# Patient Record
Sex: FEMALE | Race: WHITE | Employment: FULL TIME | URBAN - METROPOLITAN AREA
[De-identification: names, ages, dates, MRNs, and addresses within clinical notes are randomized per-mention and may not be internally consistent; named-entity substitution may affect disease eponyms.]

---

## 2017-03-09 ENCOUNTER — TELEPHONE (OUTPATIENT)
Dept: FAMILY MEDICINE CLINIC | Age: 57
End: 2017-03-09

## 2017-03-10 RX ORDER — AZITHROMYCIN 250 MG/1
TABLET, FILM COATED ORAL
Qty: 1 PACKET | Refills: 0 | Status: SHIPPED | OUTPATIENT
Start: 2017-03-10 | End: 2017-03-20

## 2017-03-10 RX ORDER — AZITHROMYCIN 250 MG/1
TABLET, FILM COATED ORAL
Qty: 1 PACKET | Refills: 0 | Status: SHIPPED | OUTPATIENT
Start: 2017-03-10 | End: 2017-03-10 | Stop reason: SDUPTHER

## 2017-04-24 RX ORDER — VALACYCLOVIR HYDROCHLORIDE 1 G/1
TABLET, FILM COATED ORAL
Qty: 21 TABLET | Refills: 4 | Status: SHIPPED | OUTPATIENT
Start: 2017-04-24 | End: 2018-06-23 | Stop reason: SDUPTHER

## 2017-06-07 RX ORDER — DIAZEPAM 5 MG/1
TABLET ORAL
Qty: 30 TABLET | Refills: 0 | Status: SHIPPED | OUTPATIENT
Start: 2017-06-07 | End: 2017-09-28 | Stop reason: SDUPTHER

## 2017-06-19 RX ORDER — ALPRAZOLAM 0.5 MG/1
TABLET ORAL
Qty: 30 TABLET | Refills: 0 | Status: SHIPPED | OUTPATIENT
Start: 2017-06-19 | End: 2020-08-12

## 2017-07-26 ENCOUNTER — OFFICE VISIT (OUTPATIENT)
Dept: FAMILY MEDICINE CLINIC | Age: 57
End: 2017-07-26

## 2017-07-26 VITALS
BODY MASS INDEX: 29.99 KG/M2 | DIASTOLIC BLOOD PRESSURE: 76 MMHG | WEIGHT: 180 LBS | HEART RATE: 73 BPM | SYSTOLIC BLOOD PRESSURE: 112 MMHG | HEIGHT: 65 IN | OXYGEN SATURATION: 97 %

## 2017-07-26 DIAGNOSIS — R53.83 OTHER FATIGUE: ICD-10-CM

## 2017-07-26 DIAGNOSIS — M25.50 ARTHRALGIA, UNSPECIFIED JOINT: ICD-10-CM

## 2017-07-26 DIAGNOSIS — Z86.79 HISTORY OF RHEUMATIC FEVER: ICD-10-CM

## 2017-07-26 DIAGNOSIS — Z00.00 ROUTINE PHYSICAL EXAMINATION: Primary | ICD-10-CM

## 2017-07-26 DIAGNOSIS — J30.1 SEASONAL ALLERGIC RHINITIS DUE TO POLLEN: ICD-10-CM

## 2017-07-26 DIAGNOSIS — Z87.891 HISTORY OF TOBACCO USE: ICD-10-CM

## 2017-07-26 DIAGNOSIS — E03.9 ACQUIRED HYPOTHYROIDISM: ICD-10-CM

## 2017-07-26 DIAGNOSIS — R00.2 HEART PALPITATIONS: ICD-10-CM

## 2017-07-26 DIAGNOSIS — Z11.4 SCREENING FOR HIV WITHOUT PRESENCE OF RISK FACTORS: ICD-10-CM

## 2017-07-26 PROCEDURE — 99396 PREV VISIT EST AGE 40-64: CPT | Performed by: FAMILY MEDICINE

## 2017-07-26 ASSESSMENT — PATIENT HEALTH QUESTIONNAIRE - PHQ9
SUM OF ALL RESPONSES TO PHQ QUESTIONS 1-9: 0
1. LITTLE INTEREST OR PLEASURE IN DOING THINGS: 0
SUM OF ALL RESPONSES TO PHQ9 QUESTIONS 1 & 2: 0
2. FEELING DOWN, DEPRESSED OR HOPELESS: 0

## 2017-07-26 ASSESSMENT — ENCOUNTER SYMPTOMS
SHORTNESS OF BREATH: 1
EYES NEGATIVE: 1

## 2017-07-27 LAB
A/G RATIO: 1.5 (ref 1.1–2.2)
ALBUMIN SERPL-MCNC: 4.3 G/DL (ref 3.4–5)
ALP BLD-CCNC: 101 U/L (ref 40–129)
ALT SERPL-CCNC: 30 U/L (ref 10–40)
ANION GAP SERPL CALCULATED.3IONS-SCNC: 15 MMOL/L (ref 3–16)
AST SERPL-CCNC: 27 U/L (ref 15–37)
BASOPHILS ABSOLUTE: 0.1 K/UL (ref 0–0.2)
BASOPHILS RELATIVE PERCENT: 1.3 %
BILIRUB SERPL-MCNC: 0.3 MG/DL (ref 0–1)
BUN BLDV-MCNC: 10 MG/DL (ref 7–20)
C-REACTIVE PROTEIN, HIGH SENSITIVITY: 0.46 MG/L (ref 0.16–3)
CALCIUM SERPL-MCNC: 9.5 MG/DL (ref 8.3–10.6)
CHLORIDE BLD-SCNC: 101 MMOL/L (ref 99–110)
CO2: 22 MMOL/L (ref 21–32)
CREAT SERPL-MCNC: 0.6 MG/DL (ref 0.6–1.1)
EOSINOPHILS ABSOLUTE: 0.2 K/UL (ref 0–0.6)
EOSINOPHILS RELATIVE PERCENT: 4 %
FOLATE: 18.82 NG/ML (ref 4.78–24.2)
GFR AFRICAN AMERICAN: >60
GFR NON-AFRICAN AMERICAN: >60
GLOBULIN: 2.9 G/DL
GLUCOSE BLD-MCNC: 106 MG/DL (ref 70–99)
HCT VFR BLD CALC: 44.7 % (ref 36–48)
HEMOGLOBIN: 14.9 G/DL (ref 12–16)
LYMPHOCYTES ABSOLUTE: 1.9 K/UL (ref 1–5.1)
LYMPHOCYTES RELATIVE PERCENT: 38.7 %
MCH RBC QN AUTO: 32.7 PG (ref 26–34)
MCHC RBC AUTO-ENTMCNC: 33.4 G/DL (ref 31–36)
MCV RBC AUTO: 97.9 FL (ref 80–100)
MONOCYTES ABSOLUTE: 0.4 K/UL (ref 0–1.3)
MONOCYTES RELATIVE PERCENT: 8.9 %
NEUTROPHILS ABSOLUTE: 2.4 K/UL (ref 1.7–7.7)
NEUTROPHILS RELATIVE PERCENT: 47.1 %
PDW BLD-RTO: 13.5 % (ref 12.4–15.4)
PLATELET # BLD: 238 K/UL (ref 135–450)
PMV BLD AUTO: 9.5 FL (ref 5–10.5)
POTASSIUM SERPL-SCNC: 4.8 MMOL/L (ref 3.5–5.1)
RBC # BLD: 4.57 M/UL (ref 4–5.2)
RHEUMATOID FACTOR: <10 IU/ML
SEDIMENTATION RATE, ERYTHROCYTE: 7 MM/HR (ref 0–30)
SODIUM BLD-SCNC: 138 MMOL/L (ref 136–145)
TOTAL PROTEIN: 7.2 G/DL (ref 6.4–8.2)
TSH REFLEX: 3.01 UIU/ML (ref 0.27–4.2)
VITAMIN B-12: 478 PG/ML (ref 211–911)
WBC # BLD: 5 K/UL (ref 4–11)

## 2017-07-28 LAB
ANA INTERPRETATION: ABNORMAL
ANA TITER: ABNORMAL
ANTI-NUCLEAR ANTIBODY (ANA): POSITIVE
ESTIMATED AVERAGE GLUCOSE: 122.6 MG/DL
HBA1C MFR BLD: 5.9 %
HIV-1 AND HIV-2 ANTIBODIES: NORMAL

## 2017-08-01 LAB — DHEAS (DHEA SULFATE): <15 UG/DL (ref 26–200)

## 2017-08-14 RX ORDER — LEVOTHYROXINE SODIUM 0.07 MG/1
TABLET ORAL
Qty: 90 TABLET | Refills: 1 | Status: SHIPPED | OUTPATIENT
Start: 2017-08-14 | End: 2017-12-14 | Stop reason: SDUPTHER

## 2017-08-21 ENCOUNTER — HOSPITAL ENCOUNTER (OUTPATIENT)
Dept: NON INVASIVE DIAGNOSTICS | Age: 57
Discharge: HOME | End: 2017-08-21
Attending: FAMILY MEDICINE | Admitting: FAMILY MEDICINE

## 2017-08-21 DIAGNOSIS — Z86.79 PERSONAL HISTORY OF OTHER DISEASES OF THE CIRCULATORY SYSTEM: ICD-10-CM

## 2017-08-21 DIAGNOSIS — Z86.79 HISTORY OF RHEUMATIC FEVER: ICD-10-CM

## 2017-08-21 DIAGNOSIS — R00.2 HEART PALPITATIONS: ICD-10-CM

## 2017-08-21 LAB
LV EF: 58 %
LVEF MODALITY: NORMAL

## 2017-09-29 RX ORDER — DIAZEPAM 5 MG/1
TABLET ORAL
Qty: 30 TABLET | Refills: 0 | Status: SHIPPED | OUTPATIENT
Start: 2017-09-29 | End: 2018-01-06 | Stop reason: SDUPTHER

## 2017-12-11 ENCOUNTER — OFFICE VISIT (OUTPATIENT)
Dept: FAMILY MEDICINE CLINIC | Age: 57
End: 2017-12-11

## 2017-12-11 VITALS
BODY MASS INDEX: 30.59 KG/M2 | WEIGHT: 183.8 LBS | SYSTOLIC BLOOD PRESSURE: 120 MMHG | DIASTOLIC BLOOD PRESSURE: 76 MMHG | TEMPERATURE: 98.3 F

## 2017-12-11 DIAGNOSIS — B96.89 ACUTE BACTERIAL SINUSITIS: Primary | ICD-10-CM

## 2017-12-11 DIAGNOSIS — H66.92 LEFT OTITIS MEDIA, UNSPECIFIED OTITIS MEDIA TYPE: ICD-10-CM

## 2017-12-11 DIAGNOSIS — J01.90 ACUTE BACTERIAL SINUSITIS: Primary | ICD-10-CM

## 2017-12-11 PROCEDURE — 3017F COLORECTAL CA SCREEN DOC REV: CPT | Performed by: FAMILY MEDICINE

## 2017-12-11 PROCEDURE — G8427 DOCREV CUR MEDS BY ELIG CLIN: HCPCS | Performed by: FAMILY MEDICINE

## 2017-12-11 PROCEDURE — 3014F SCREEN MAMMO DOC REV: CPT | Performed by: FAMILY MEDICINE

## 2017-12-11 PROCEDURE — 99213 OFFICE O/P EST LOW 20 MIN: CPT | Performed by: FAMILY MEDICINE

## 2017-12-11 PROCEDURE — G8417 CALC BMI ABV UP PARAM F/U: HCPCS | Performed by: FAMILY MEDICINE

## 2017-12-11 PROCEDURE — 1036F TOBACCO NON-USER: CPT | Performed by: FAMILY MEDICINE

## 2017-12-11 PROCEDURE — G8482 FLU IMMUNIZE ORDER/ADMIN: HCPCS | Performed by: FAMILY MEDICINE

## 2017-12-11 RX ORDER — AZITHROMYCIN 250 MG/1
TABLET, FILM COATED ORAL
Qty: 1 PACKET | Refills: 0 | Status: SHIPPED | OUTPATIENT
Start: 2017-12-11 | End: 2018-01-02 | Stop reason: SDUPTHER

## 2017-12-11 ASSESSMENT — ENCOUNTER SYMPTOMS
GASTROINTESTINAL NEGATIVE: 1
SINUS PRESSURE: 1
EYES NEGATIVE: 1
RHINORRHEA: 1
SINUS PAIN: 1
WHEEZING: 0
COUGH: 1

## 2017-12-11 NOTE — PROGRESS NOTES
Subjective:      Patient ID: Gayle Verde is a 62 y.o. female. Chief Complaint   Patient presents with    Otalgia     left x 1 wk    Congestion    Cough     some production green       HPI  Cold x over a week  Now chest & ear   Mucous is yellow  OTC Saline   Claritin     Review of Systems   HENT: Positive for congestion, ear pain, rhinorrhea, sinus pain and sinus pressure. RT side of face+ pain   Eyes: Negative. Respiratory: Positive for cough. Negative for wheezing. Cardiovascular: Negative. Gastrointestinal: Negative. Endocrine:        Thyroid    Genitourinary: Negative for dysuria. Musculoskeletal: Negative. Allergic/Immunologic: Positive for environmental allergies. Neurological: Negative for headaches. Hematological: Negative. Objective:   Physical Exam   Constitutional: She is oriented to person, place, and time. She appears well-developed and well-nourished. No distress. HENT:   Head: Normocephalic. Left TM red   Rt TM clear  Tender RT side of face   Eyes: Conjunctivae and EOM are normal. Pupils are equal, round, and reactive to light. Neck: Normal range of motion. Neck supple. Cardiovascular: Normal rate, regular rhythm and normal heart sounds. No murmur heard. Pulmonary/Chest: Effort normal. She has no wheezes. She has no rales. Neurological: She is alert and oriented to person, place, and time. Skin: Skin is warm. She is not diaphoretic. Vitals reviewed. /76   Temp 98.3 °F (36.8 °C) (Oral)   Wt 183 lb 12.8 oz (83.4 kg)   LMP 01/21/2007   BMI 30.59 kg/m²     Assessment:      1. Acute bacterial sinusitis  Loratadine (CLARITIN PO)    azithromycin (ZITHROMAX) 250 MG tablet   2.  Left otitis media, unspecified otitis media type  Loratadine (CLARITIN PO)    azithromycin (ZITHROMAX) 250 MG tablet           Plan:      She has Flonase  Coricidin HB   She is going [place] [facility] in March 2018

## 2017-12-15 RX ORDER — LEVOTHYROXINE SODIUM 0.07 MG/1
TABLET ORAL
Qty: 90 TABLET | Refills: 1 | Status: SHIPPED | OUTPATIENT
Start: 2017-12-15 | End: 2018-01-26 | Stop reason: SDUPTHER

## 2018-01-02 DIAGNOSIS — B96.89 ACUTE BACTERIAL SINUSITIS: ICD-10-CM

## 2018-01-02 DIAGNOSIS — J01.90 ACUTE BACTERIAL SINUSITIS: ICD-10-CM

## 2018-01-02 DIAGNOSIS — H66.92 LEFT OTITIS MEDIA, UNSPECIFIED OTITIS MEDIA TYPE: ICD-10-CM

## 2018-01-02 RX ORDER — AZITHROMYCIN 250 MG/1
TABLET, FILM COATED ORAL
Qty: 1 PACKET | Refills: 0 | Status: SHIPPED | OUTPATIENT
Start: 2018-01-02 | End: 2018-01-12

## 2018-01-05 DIAGNOSIS — R73.09 HIGH GLUCOSE: Primary | ICD-10-CM

## 2018-01-06 DIAGNOSIS — F41.9 ANXIETY: Primary | ICD-10-CM

## 2018-01-08 RX ORDER — DIAZEPAM 5 MG/1
TABLET ORAL
Qty: 30 TABLET | Refills: 0 | Status: SHIPPED | OUTPATIENT
Start: 2018-01-08 | End: 2018-01-26 | Stop reason: SDUPTHER

## 2018-01-26 DIAGNOSIS — F41.9 ANXIETY: ICD-10-CM

## 2018-01-26 DIAGNOSIS — R73.09 HIGH GLUCOSE: ICD-10-CM

## 2018-01-26 LAB — GLUCOSE BLD-MCNC: 101 MG/DL (ref 70–99)

## 2018-01-26 RX ORDER — LEVOTHYROXINE SODIUM 0.07 MG/1
TABLET ORAL
Qty: 90 TABLET | Refills: 1 | Status: SHIPPED | OUTPATIENT
Start: 2018-01-26 | End: 2018-04-20 | Stop reason: SDUPTHER

## 2018-01-26 RX ORDER — DIAZEPAM 5 MG/1
TABLET ORAL
Qty: 30 TABLET | Refills: 0 | Status: SHIPPED | OUTPATIENT
Start: 2018-01-26 | End: 2018-06-11 | Stop reason: SDUPTHER

## 2018-01-26 RX ORDER — FAMOTIDINE 10 MG
20 TABLET ORAL 2 TIMES DAILY
Qty: 60 TABLET | Refills: 5 | Status: SHIPPED | OUTPATIENT
Start: 2018-01-26 | End: 2019-05-06 | Stop reason: SDUPTHER

## 2018-02-26 ENCOUNTER — PATIENT MESSAGE (OUTPATIENT)
Dept: FAMILY MEDICINE CLINIC | Age: 58
End: 2018-02-26

## 2018-04-20 RX ORDER — LEVOTHYROXINE SODIUM 0.07 MG/1
TABLET ORAL
Qty: 90 TABLET | Refills: 1 | Status: SHIPPED | OUTPATIENT
Start: 2018-04-20 | End: 2018-12-04 | Stop reason: SDUPTHER

## 2018-06-11 DIAGNOSIS — F41.9 ANXIETY: ICD-10-CM

## 2018-06-11 DIAGNOSIS — T75.3XXA MOTION SICKNESS, INITIAL ENCOUNTER: Primary | ICD-10-CM

## 2018-06-11 RX ORDER — DIAZEPAM 5 MG/1
TABLET ORAL
Qty: 10 TABLET | Refills: 0 | Status: SHIPPED | OUTPATIENT
Start: 2018-06-11 | End: 2018-09-05 | Stop reason: SDUPTHER

## 2018-06-25 RX ORDER — VALACYCLOVIR HYDROCHLORIDE 1 G/1
TABLET, FILM COATED ORAL
Qty: 21 TABLET | Refills: 3 | Status: SHIPPED | OUTPATIENT
Start: 2018-06-25 | End: 2019-01-14 | Stop reason: SDUPTHER

## 2018-07-21 RX ORDER — MINOCYCLINE HYDROCHLORIDE 75 MG/1
CAPSULE ORAL
Qty: 60 CAPSULE | Refills: 1 | Status: SHIPPED | OUTPATIENT
Start: 2018-07-21 | End: 2019-01-23 | Stop reason: SDUPTHER

## 2018-07-21 NOTE — TELEPHONE ENCOUNTER
Medication:   Requested Prescriptions     Pending Prescriptions Disp Refills    minocycline (MINOCIN;DYNACIN) 75 MG capsule [Pharmacy Med Name: MINOCYCLINE 75 MG CAPSULE] 60 capsule 1     Sig: TAKE ONE CAPSULE BY MOUTH TWICE A DAY   last appt:  12.11.17  Next appt: Visit date not found

## 2018-08-13 ENCOUNTER — OFFICE VISIT (OUTPATIENT)
Dept: FAMILY MEDICINE CLINIC | Age: 58
End: 2018-08-13

## 2018-08-13 VITALS
HEIGHT: 66 IN | HEART RATE: 69 BPM | OXYGEN SATURATION: 98 % | TEMPERATURE: 98.3 F | DIASTOLIC BLOOD PRESSURE: 72 MMHG | RESPIRATION RATE: 16 BRPM | WEIGHT: 185.8 LBS | BODY MASS INDEX: 29.86 KG/M2 | SYSTOLIC BLOOD PRESSURE: 118 MMHG

## 2018-08-13 DIAGNOSIS — Z00.00 ROUTINE PHYSICAL EXAMINATION: ICD-10-CM

## 2018-08-13 DIAGNOSIS — R00.2 HEART PALPITATIONS: ICD-10-CM

## 2018-08-13 DIAGNOSIS — Z13.220 LIPID SCREENING: ICD-10-CM

## 2018-08-13 DIAGNOSIS — K90.0 CELIAC DISEASE: ICD-10-CM

## 2018-08-13 DIAGNOSIS — Z87.891 FORMER SMOKER: ICD-10-CM

## 2018-08-13 DIAGNOSIS — Z00.00 ROUTINE PHYSICAL EXAMINATION: Primary | ICD-10-CM

## 2018-08-13 DIAGNOSIS — R14.0 ABDOMINAL BLOATING: ICD-10-CM

## 2018-08-13 DIAGNOSIS — Z13.1 DIABETES MELLITUS SCREENING: ICD-10-CM

## 2018-08-13 DIAGNOSIS — E03.9 ACQUIRED HYPOTHYROIDISM: ICD-10-CM

## 2018-08-13 DIAGNOSIS — R39.15 URGENCY OF URINATION: ICD-10-CM

## 2018-08-13 LAB
BASOPHILS ABSOLUTE: 0 K/UL (ref 0–0.2)
BASOPHILS RELATIVE PERCENT: 0.8 %
BILIRUBIN, POC: NORMAL
BLOOD URINE, POC: NORMAL
CLARITY, POC: CLEAR
COLOR, POC: YELLOW
EOSINOPHILS ABSOLUTE: 0.1 K/UL (ref 0–0.6)
EOSINOPHILS RELATIVE PERCENT: 1.7 %
GLUCOSE URINE, POC: NORMAL
HCT VFR BLD CALC: 44.5 % (ref 36–48)
HEMOGLOBIN: 14.6 G/DL (ref 12–16)
KETONES, POC: NORMAL
LEUKOCYTE EST, POC: NORMAL
LYMPHOCYTES ABSOLUTE: 1.8 K/UL (ref 1–5.1)
LYMPHOCYTES RELATIVE PERCENT: 34.7 %
MCH RBC QN AUTO: 32.1 PG (ref 26–34)
MCHC RBC AUTO-ENTMCNC: 32.9 G/DL (ref 31–36)
MCV RBC AUTO: 97.4 FL (ref 80–100)
MONOCYTES ABSOLUTE: 0.6 K/UL (ref 0–1.3)
MONOCYTES RELATIVE PERCENT: 10.9 %
NEUTROPHILS ABSOLUTE: 2.8 K/UL (ref 1.7–7.7)
NEUTROPHILS RELATIVE PERCENT: 51.9 %
NITRITE, POC: NORMAL
PDW BLD-RTO: 14.5 % (ref 12.4–15.4)
PH, POC: 7
PLATELET # BLD: 240 K/UL (ref 135–450)
PMV BLD AUTO: 9.4 FL (ref 5–10.5)
PROTEIN, POC: NORMAL
RBC # BLD: 4.57 M/UL (ref 4–5.2)
SPECIFIC GRAVITY, POC: 1.01
UROBILINOGEN, POC: 0.2
WBC # BLD: 5.3 K/UL (ref 4–11)

## 2018-08-13 PROCEDURE — 81002 URINALYSIS NONAUTO W/O SCOPE: CPT | Performed by: FAMILY MEDICINE

## 2018-08-13 PROCEDURE — 99396 PREV VISIT EST AGE 40-64: CPT | Performed by: FAMILY MEDICINE

## 2018-08-13 ASSESSMENT — ENCOUNTER SYMPTOMS
EYE ITCHING: 0
VOICE CHANGE: 0
EYE REDNESS: 0
ABDOMINAL DISTENTION: 0
DIARRHEA: 0
BACK PAIN: 0
CONSTIPATION: 0
WHEEZING: 0
APNEA: 0
NAUSEA: 0
COUGH: 0
EYE DISCHARGE: 0
PHOTOPHOBIA: 0
SINUS PAIN: 0
VOMITING: 0
SINUS PRESSURE: 0
EYE PAIN: 0
SORE THROAT: 0
CHEST TIGHTNESS: 0
CHOKING: 0
BLOOD IN STOOL: 0
ABDOMINAL PAIN: 0
COLOR CHANGE: 0
SHORTNESS OF BREATH: 0
RHINORRHEA: 0
TROUBLE SWALLOWING: 0

## 2018-08-13 ASSESSMENT — PATIENT HEALTH QUESTIONNAIRE - PHQ9
2. FEELING DOWN, DEPRESSED OR HOPELESS: 0
SUM OF ALL RESPONSES TO PHQ QUESTIONS 1-9: 0
1. LITTLE INTEREST OR PLEASURE IN DOING THINGS: 0
SUM OF ALL RESPONSES TO PHQ QUESTIONS 1-9: 0
SUM OF ALL RESPONSES TO PHQ9 QUESTIONS 1 & 2: 0

## 2018-08-13 NOTE — PROGRESS NOTES
Samia Collet  Due for GYM  Due for Mammogram   Musculoskeletal: Negative for back pain and gait problem. Skin: Negative for color change and rash. Allergic/Immunologic: Positive for environmental allergies. Negative for food allergies. Advice get allergy shot  But she didn't like idea   Neurological: Negative for dizziness, tremors, light-headedness, numbness and headaches. Psychiatric/Behavioral: Negative for agitation, behavioral problems, decreased concentration and sleep disturbance. The patient is not hyperactive. OBJECTIVE:  /72 (Site: Left Arm, Position: Sitting, Cuff Size: Medium Adult)   Pulse 69   Temp 98.3 °F (36.8 °C) (Oral)   Resp 16   Ht 5' 5.75\" (1.67 m)   Wt 185 lb 12.8 oz (84.3 kg)   LMP 01/21/2007   SpO2 98%   BMI 30.22 kg/m²   Physical Exam   Constitutional: She is oriented to person, place, and time. She appears well-developed and well-nourished. No distress. HENT:   Head: Normocephalic. Right Ear: External ear normal.   Left Ear: External ear normal.   Nose: Nose normal.   Mouth/Throat: Oropharynx is clear and moist.   Eyes: Pupils are equal, round, and reactive to light. Conjunctivae and EOM are normal. Right eye exhibits no discharge. Left eye exhibits no discharge. No scleral icterus. Neck: Normal range of motion. Neck supple. No thyromegaly present. Cardiovascular: Normal rate, regular rhythm, normal heart sounds and intact distal pulses. No murmur heard. Pulmonary/Chest: Effort normal and breath sounds normal. No respiratory distress. She has no wheezes. She has no rales. She exhibits no tenderness. Abdominal: Soft. Bowel sounds are normal. She exhibits no distension and no mass. There is no tenderness. There is no rebound and no guarding. Musculoskeletal: Normal range of motion. She exhibits no edema. Lymphadenopathy:     She has no cervical adenopathy. Neurological: She is alert and oriented to person, place, and time.  She has normal

## 2018-08-14 LAB
A/G RATIO: 1.6 (ref 1.1–2.2)
ALBUMIN SERPL-MCNC: 4.5 G/DL (ref 3.4–5)
ALP BLD-CCNC: 78 U/L (ref 40–129)
ALT SERPL-CCNC: 31 U/L (ref 10–40)
ANION GAP SERPL CALCULATED.3IONS-SCNC: 12 MMOL/L (ref 3–16)
AST SERPL-CCNC: 30 U/L (ref 15–37)
BILIRUB SERPL-MCNC: 0.4 MG/DL (ref 0–1)
BUN BLDV-MCNC: 8 MG/DL (ref 7–20)
CALCIUM SERPL-MCNC: 10 MG/DL (ref 8.3–10.6)
CHLORIDE BLD-SCNC: 102 MMOL/L (ref 99–110)
CHOLESTEROL, TOTAL: 181 MG/DL (ref 0–199)
CO2: 26 MMOL/L (ref 21–32)
CREAT SERPL-MCNC: 0.6 MG/DL (ref 0.6–1.1)
ESTIMATED AVERAGE GLUCOSE: 114 MG/DL
GFR AFRICAN AMERICAN: >60
GFR NON-AFRICAN AMERICAN: >60
GLOBULIN: 2.9 G/DL
GLUCOSE BLD-MCNC: 94 MG/DL (ref 70–99)
HBA1C MFR BLD: 5.6 %
HDLC SERPL-MCNC: 73 MG/DL (ref 40–60)
LDL CHOLESTEROL CALCULATED: 90 MG/DL
POTASSIUM SERPL-SCNC: 4.9 MMOL/L (ref 3.5–5.1)
SODIUM BLD-SCNC: 140 MMOL/L (ref 136–145)
TOTAL PROTEIN: 7.4 G/DL (ref 6.4–8.2)
TRIGL SERPL-MCNC: 89 MG/DL (ref 0–150)
TSH SERPL DL<=0.05 MIU/L-ACNC: 1.68 UIU/ML (ref 0.27–4.2)
VLDLC SERPL CALC-MCNC: 18 MG/DL

## 2018-08-15 LAB — URINE CULTURE, ROUTINE: NORMAL

## 2018-09-05 DIAGNOSIS — T75.3XXA MOTION SICKNESS, INITIAL ENCOUNTER: ICD-10-CM

## 2018-09-05 DIAGNOSIS — F41.9 ANXIETY: ICD-10-CM

## 2018-09-05 RX ORDER — DIAZEPAM 5 MG/1
TABLET ORAL
Qty: 10 TABLET | Refills: 0 | Status: SHIPPED | OUTPATIENT
Start: 2018-09-05 | End: 2018-09-15

## 2018-09-26 ENCOUNTER — TELEPHONE (OUTPATIENT)
Dept: FAMILY MEDICINE CLINIC | Age: 58
End: 2018-09-26

## 2018-09-26 ENCOUNTER — OFFICE VISIT (OUTPATIENT)
Dept: FAMILY MEDICINE CLINIC | Age: 58
End: 2018-09-26
Payer: COMMERCIAL

## 2018-09-26 VITALS
SYSTOLIC BLOOD PRESSURE: 122 MMHG | DIASTOLIC BLOOD PRESSURE: 82 MMHG | HEART RATE: 79 BPM | OXYGEN SATURATION: 96 % | WEIGHT: 189 LBS | TEMPERATURE: 98.5 F | BODY MASS INDEX: 30.74 KG/M2

## 2018-09-26 DIAGNOSIS — J01.90 ACUTE BACTERIAL SINUSITIS: Primary | ICD-10-CM

## 2018-09-26 DIAGNOSIS — B96.89 ACUTE BACTERIAL SINUSITIS: Primary | ICD-10-CM

## 2018-09-26 DIAGNOSIS — J30.1 SEASONAL ALLERGIC RHINITIS DUE TO POLLEN: ICD-10-CM

## 2018-09-26 DIAGNOSIS — M54.2 NECK PAIN: ICD-10-CM

## 2018-09-26 PROCEDURE — 3017F COLORECTAL CA SCREEN DOC REV: CPT | Performed by: FAMILY MEDICINE

## 2018-09-26 PROCEDURE — 99213 OFFICE O/P EST LOW 20 MIN: CPT | Performed by: FAMILY MEDICINE

## 2018-09-26 PROCEDURE — G8427 DOCREV CUR MEDS BY ELIG CLIN: HCPCS | Performed by: FAMILY MEDICINE

## 2018-09-26 PROCEDURE — 1036F TOBACCO NON-USER: CPT | Performed by: FAMILY MEDICINE

## 2018-09-26 PROCEDURE — G8417 CALC BMI ABV UP PARAM F/U: HCPCS | Performed by: FAMILY MEDICINE

## 2018-09-26 RX ORDER — AZITHROMYCIN 250 MG/1
250 TABLET, FILM COATED ORAL DAILY
Qty: 1 PACKET | Refills: 0 | Status: SHIPPED | OUTPATIENT
Start: 2018-09-26 | End: 2018-09-26

## 2018-09-26 RX ORDER — AZITHROMYCIN 250 MG/1
TABLET, FILM COATED ORAL
Qty: 1 PACKET | Refills: 0 | Status: SHIPPED | OUTPATIENT
Start: 2018-09-26 | End: 2019-08-06 | Stop reason: ALTCHOICE

## 2018-09-26 ASSESSMENT — ENCOUNTER SYMPTOMS
SHORTNESS OF BREATH: 0
WHEEZING: 0
CHOKING: 0
SINUS PRESSURE: 1
COUGH: 0
EYES NEGATIVE: 1
SORE THROAT: 1

## 2018-11-05 ENCOUNTER — TELEPHONE (OUTPATIENT)
Dept: FAMILY MEDICINE CLINIC | Age: 58
End: 2018-11-05

## 2018-11-05 NOTE — TELEPHONE ENCOUNTER
Pt advised states she wants to go to [facility] since that is where she had her others done.

## 2018-11-09 ENCOUNTER — TELEPHONE (OUTPATIENT)
Dept: FAMILY MEDICINE CLINIC | Age: 58
End: 2018-11-09

## 2018-11-12 ENCOUNTER — TELEPHONE (OUTPATIENT)
Dept: FAMILY MEDICINE CLINIC | Age: 58
End: 2018-11-12

## 2018-12-05 RX ORDER — LEVOTHYROXINE SODIUM 0.07 MG/1
TABLET ORAL
Qty: 90 TABLET | Refills: 1 | Status: SHIPPED | OUTPATIENT
Start: 2018-12-05 | End: 2019-02-25

## 2018-12-18 ENCOUNTER — PATIENT MESSAGE (OUTPATIENT)
Dept: FAMILY MEDICINE CLINIC | Age: 58
End: 2018-12-18

## 2018-12-18 NOTE — TELEPHONE ENCOUNTER
From: Franck Gonzalez  To: Michelle Hurd MD  Sent: 12/18/2018 12:06 PM EST  Subject: Visit Follow-Up Question    I received a denial for my CT chest scan. The letter says it was coded wrong. They said they informed you so please let me know when this is corrected and rescheduled (submitted).

## 2019-01-14 RX ORDER — VALACYCLOVIR HYDROCHLORIDE 1 G/1
1000 TABLET, FILM COATED ORAL DAILY
Qty: 21 TABLET | Refills: 3 | Status: SHIPPED | OUTPATIENT
Start: 2019-01-14 | End: 2019-07-29

## 2019-01-23 RX ORDER — MINOCYCLINE HYDROCHLORIDE 75 MG/1
CAPSULE ORAL
Qty: 60 CAPSULE | Refills: 2 | Status: SHIPPED | OUTPATIENT
Start: 2019-01-23 | End: 2019-08-06 | Stop reason: ALTCHOICE

## 2019-04-12 RX ORDER — PRASTERONE (DHEA) 25 MG
25 CAPSULE ORAL DAILY
Qty: 30 CAPSULE | Refills: 5 | Status: SHIPPED | OUTPATIENT
Start: 2019-04-12 | End: 2019-08-12

## 2019-04-12 NOTE — TELEPHONE ENCOUNTER
Medication:   Requested Prescriptions     Pending Prescriptions Disp Refills    DHEA 25 MG CAPS 30 capsule 5     Last Filled: 2.4.19  Last appt: 9.26.18   Next appt: Visit date not found    Last OARRS:   RX Monitoring 1/8/2018   Attestation The Prescription Monitoring Report for this patient was reviewed today.

## 2019-05-07 RX ORDER — FAMOTIDINE 10 MG
20 TABLET ORAL 2 TIMES DAILY
Qty: 60 TABLET | Refills: 5 | Status: SHIPPED | OUTPATIENT
Start: 2019-05-07

## 2019-05-24 ENCOUNTER — TELEPHONE (OUTPATIENT)
Dept: FAMILY MEDICINE CLINIC | Age: 59
End: 2019-05-24

## 2019-05-24 NOTE — TELEPHONE ENCOUNTER
Eyes are red, swollen, clear discharged, and they hurt, not matted in the AM.  Feels like it is allergy induced. She is allergic to dust and she had took down her curtains two days ago and they were irene. She doesn't want to be seen if not needed. Please advise. Medication:   Requested Prescriptions      No prescriptions requested or ordered in this encounter          Last appt: 9/26/2018   Next appt: Visit date not found    Last OARRS:   RX Monitoring 1/8/2018   Attestation The Prescription Monitoring Report for this patient was reviewed today.

## 2019-05-24 NOTE — TELEPHONE ENCOUNTER
Clear eye watery & swollen eye lid  She is using cold compressor with good result  She has allergy eye drop from eye specialist she will use eye drops  OTC Claritin

## 2019-07-01 RX ORDER — LEVOTHYROXINE SODIUM 0.07 MG/1
TABLET ORAL
Qty: 30 TABLET | Refills: 0 | Status: SHIPPED | OUTPATIENT
Start: 2019-07-01 | End: 2019-08-06 | Stop reason: SDUPTHER

## 2019-07-29 RX ORDER — VALACYCLOVIR HYDROCHLORIDE 1 G/1
TABLET, FILM COATED ORAL
Qty: 21 TABLET | Refills: 2 | Status: SHIPPED | OUTPATIENT
Start: 2019-07-29 | End: 2020-08-12

## 2019-08-05 ENCOUNTER — OFFICE VISIT (OUTPATIENT)
Dept: PRIMARY CARE CLINIC | Age: 59
End: 2019-08-05
Payer: COMMERCIAL

## 2019-08-05 VITALS
HEIGHT: 66 IN | OXYGEN SATURATION: 98 % | WEIGHT: 187.8 LBS | DIASTOLIC BLOOD PRESSURE: 72 MMHG | SYSTOLIC BLOOD PRESSURE: 116 MMHG | HEART RATE: 73 BPM | BODY MASS INDEX: 30.18 KG/M2

## 2019-08-05 DIAGNOSIS — E07.9 THYROID DISORDER: ICD-10-CM

## 2019-08-05 DIAGNOSIS — R00.2 HEART PALPITATIONS: ICD-10-CM

## 2019-08-05 DIAGNOSIS — R60.9 PERIPHERAL EDEMA: ICD-10-CM

## 2019-08-05 DIAGNOSIS — H53.9 VISUAL CHANGES: ICD-10-CM

## 2019-08-05 DIAGNOSIS — H02.533 LID RETRACTION OF RIGHT EYE: ICD-10-CM

## 2019-08-05 DIAGNOSIS — Z00.00 ROUTINE PHYSICAL EXAMINATION: Primary | ICD-10-CM

## 2019-08-05 PROBLEM — E66.9 OBESITY: Status: ACTIVE | Noted: 2019-08-05

## 2019-08-05 LAB
A/G RATIO: 1.7 (ref 1.1–2.2)
ALBUMIN SERPL-MCNC: 4.7 G/DL (ref 3.4–5)
ALP BLD-CCNC: 68 U/L (ref 40–129)
ALT SERPL-CCNC: 31 U/L (ref 10–40)
ANION GAP SERPL CALCULATED.3IONS-SCNC: 13 MMOL/L (ref 3–16)
AST SERPL-CCNC: 31 U/L (ref 15–37)
BASOPHILS ABSOLUTE: 0.1 K/UL (ref 0–0.2)
BASOPHILS RELATIVE PERCENT: 0.9 %
BILIRUB SERPL-MCNC: 0.3 MG/DL (ref 0–1)
BUN BLDV-MCNC: 8 MG/DL (ref 7–20)
CALCIUM SERPL-MCNC: 9.9 MG/DL (ref 8.3–10.6)
CHLORIDE BLD-SCNC: 104 MMOL/L (ref 99–110)
CO2: 26 MMOL/L (ref 21–32)
CREAT SERPL-MCNC: 0.6 MG/DL (ref 0.6–1.1)
EOSINOPHILS ABSOLUTE: 0.3 K/UL (ref 0–0.6)
EOSINOPHILS RELATIVE PERCENT: 4.9 %
FREE THYROXINE INDEX: 7.4 UG/DL (ref 4.4–11.4)
GFR AFRICAN AMERICAN: >60
GFR NON-AFRICAN AMERICAN: >60
GLOBULIN: 2.8 G/DL
GLUCOSE BLD-MCNC: 109 MG/DL (ref 70–99)
HCT VFR BLD CALC: 41.9 % (ref 36–48)
HEMOGLOBIN: 14.2 G/DL (ref 12–16)
LYMPHOCYTES ABSOLUTE: 1.9 K/UL (ref 1–5.1)
LYMPHOCYTES RELATIVE PERCENT: 34.5 %
MCH RBC QN AUTO: 33 PG (ref 26–34)
MCHC RBC AUTO-ENTMCNC: 33.9 G/DL (ref 31–36)
MCV RBC AUTO: 97.2 FL (ref 80–100)
MONOCYTES ABSOLUTE: 0.6 K/UL (ref 0–1.3)
MONOCYTES RELATIVE PERCENT: 10 %
NEUTROPHILS ABSOLUTE: 2.7 K/UL (ref 1.7–7.7)
NEUTROPHILS RELATIVE PERCENT: 49.7 %
PDW BLD-RTO: 13.9 % (ref 12.4–15.4)
PLATELET # BLD: 234 K/UL (ref 135–450)
PMV BLD AUTO: 9.4 FL (ref 5–10.5)
POTASSIUM SERPL-SCNC: 4.9 MMOL/L (ref 3.5–5.1)
RBC # BLD: 4.31 M/UL (ref 4–5.2)
SODIUM BLD-SCNC: 143 MMOL/L (ref 136–145)
T3 FREE: 2.7 PG/ML (ref 2.3–4.2)
T3 UPTAKE PERCENT: 1 TBI (ref 0.8–1.3)
T4 FREE: 1.4 NG/DL (ref 0.9–1.8)
T4 TOTAL: 7.4 UG/DL (ref 4.5–10.9)
TOTAL PROTEIN: 7.5 G/DL (ref 6.4–8.2)
TSH REFLEX: 1.12 UIU/ML (ref 0.27–4.2)
WBC # BLD: 5.5 K/UL (ref 4–11)

## 2019-08-05 PROCEDURE — 99396 PREV VISIT EST AGE 40-64: CPT | Performed by: FAMILY MEDICINE

## 2019-08-05 ASSESSMENT — PATIENT HEALTH QUESTIONNAIRE - PHQ9
SUM OF ALL RESPONSES TO PHQ QUESTIONS 1-9: 0
SUM OF ALL RESPONSES TO PHQ QUESTIONS 1-9: 0
1. LITTLE INTEREST OR PLEASURE IN DOING THINGS: 0
2. FEELING DOWN, DEPRESSED OR HOPELESS: 0
SUM OF ALL RESPONSES TO PHQ9 QUESTIONS 1 & 2: 0

## 2019-08-05 ASSESSMENT — ENCOUNTER SYMPTOMS
BACK PAIN: 0
ALLERGIC/IMMUNOLOGIC NEGATIVE: 1

## 2019-08-05 NOTE — PROGRESS NOTES
Results   Component Value Date    CHOLHDLRATIO 1.8 11/21/2012       Chemistry        Component Value Date/Time     08/13/2018 1231    K 4.9 08/13/2018 1231     08/13/2018 1231    CO2 26 08/13/2018 1231    BUN 8 08/13/2018 1231    CREATININE 0.6 08/13/2018 1231        Component Value Date/Time    CALCIUM 10.0 08/13/2018 1231    ALKPHOS 78 08/13/2018 1231    AST 30 08/13/2018 1231    ALT 31 08/13/2018 1231    BILITOT 0.4 08/13/2018 1231            Review of Systems   Constitutional:        Weight gain   HENT: Positive for congestion. Eyes:        Abnormal head CT ?eye muscle    Respiratory:        Quit tobacco  Hx CT lung done few x   Cardiovascular: Positive for palpitations. Gastrointestinal:        Hx HCV resolved  GI specialist   Endocrine:        Thyroid disorder   Genitourinary: Negative for dysuria. Musculoskeletal: Negative for back pain and gait problem. Allergic/Immunologic: Negative. Neurological: Negative for dizziness, light-headedness and headaches. Hematological: Negative. Psychiatric/Behavioral:        Anxiety        OBJECTIVE:  /72 (Site: Left Upper Arm, Position: Sitting, Cuff Size: Medium Adult)   Pulse 73   Ht 5' 5.5\" (1.664 m)   Wt 187 lb 12.8 oz (85.2 kg)   LMP 01/21/2007   SpO2 98%   BMI 30.78 kg/m²   Physical Exam    ASSESSMENT/PLAN:      Diagnosis Orders   1. Routine physical examination     2. Thyroid disorder  TSH with Reflex    T4, Free    T3, Free    ECHO Complete 2D W Doppler Leeroy Melo MD, Endocrinology, [facility]   3. Heart palpitations  ECHO Complete 2D Marylou Michaels MD, Endocrinology, [facility]    CBC Auto Differential    Comprehensive Metabolic Panel    Hemoglobin A1C   4. Visual changes  Bonni Rinne, MD, Endocrinology, [facility]   5. Peripheral edema     6.  Lid retraction of right eye           [email or web address]   [facility]   Endocrinology   ECHO

## 2019-08-06 ENCOUNTER — OFFICE VISIT (OUTPATIENT)
Dept: ENDOCRINOLOGY | Age: 59
End: 2019-08-06
Payer: COMMERCIAL

## 2019-08-06 VITALS
DIASTOLIC BLOOD PRESSURE: 81 MMHG | WEIGHT: 189.6 LBS | BODY MASS INDEX: 30.47 KG/M2 | HEART RATE: 63 BPM | OXYGEN SATURATION: 97 % | HEIGHT: 66 IN | SYSTOLIC BLOOD PRESSURE: 138 MMHG

## 2019-08-06 DIAGNOSIS — K90.0 CELIAC DISEASE: ICD-10-CM

## 2019-08-06 DIAGNOSIS — E66.9 CLASS 1 OBESITY WITH BODY MASS INDEX (BMI) OF 31.0 TO 31.9 IN ADULT, UNSPECIFIED OBESITY TYPE, UNSPECIFIED WHETHER SERIOUS COMORBIDITY PRESENT: ICD-10-CM

## 2019-08-06 DIAGNOSIS — R73.01 IFG (IMPAIRED FASTING GLUCOSE): ICD-10-CM

## 2019-08-06 DIAGNOSIS — E03.9 ACQUIRED HYPOTHYROIDISM: Primary | ICD-10-CM

## 2019-08-06 DIAGNOSIS — E05.00 GRAVES' ORBITOPATHY: ICD-10-CM

## 2019-08-06 DIAGNOSIS — E03.9 ACQUIRED HYPOTHYROIDISM: ICD-10-CM

## 2019-08-06 LAB
ANTI-THYROGLOB ABS: <10 IU/ML
ESTIMATED AVERAGE GLUCOSE: 125.5 MG/DL
HBA1C MFR BLD: 6 %
T3 TOTAL: 1.01 NG/ML (ref 0.8–2)
T4 TOTAL: 6.8 UG/DL (ref 4.5–10.9)
THYROID PEROXIDASE (TPO) ABS: 11 IU/ML

## 2019-08-06 PROCEDURE — G8417 CALC BMI ABV UP PARAM F/U: HCPCS | Performed by: INTERNAL MEDICINE

## 2019-08-06 PROCEDURE — 99205 OFFICE O/P NEW HI 60 MIN: CPT | Performed by: INTERNAL MEDICINE

## 2019-08-06 PROCEDURE — G8427 DOCREV CUR MEDS BY ELIG CLIN: HCPCS | Performed by: INTERNAL MEDICINE

## 2019-08-06 PROCEDURE — 1036F TOBACCO NON-USER: CPT | Performed by: INTERNAL MEDICINE

## 2019-08-06 PROCEDURE — 3017F COLORECTAL CA SCREEN DOC REV: CPT | Performed by: INTERNAL MEDICINE

## 2019-08-06 RX ORDER — LEVOTHYROXINE SODIUM 75 UG/1
1 CAPSULE ORAL DAILY
Qty: 30 CAPSULE | Refills: 3 | Status: SHIPPED | OUTPATIENT
Start: 2019-08-06 | End: 2019-09-23

## 2019-08-06 RX ORDER — TURMERIC 95 %
POWDER (GRAM) MISCELLANEOUS
COMMUNITY

## 2019-08-06 RX ORDER — FEXOFENADINE HCL 180 MG/1
180 TABLET ORAL DAILY
Status: ON HOLD | COMMUNITY
End: 2019-10-31 | Stop reason: ALTCHOICE

## 2019-08-06 RX ORDER — PSEUDOEPHEDRINE HYDROCHLORIDE 30 MG/1
30 TABLET ORAL EVERY 4 HOURS PRN
COMMUNITY
End: 2020-08-12

## 2019-08-06 NOTE — PROGRESS NOTES
be very unlikely. No evidence of neck mass or adenopathy. Decrease in volume of thyroid tissue since 2008 with no focal suspicious lesion. Thank you for allowing me to assist in the care of your patient. If health care providers have any questions or concerns regarding this report, please call my personal cell [phone]. Report Verified by: Ronald Talbert MD at 7/1/2019 5:02 PM EDT           Past Medical History:   Diagnosis Date    Allergic rhinitis     Anxiety     Celiac disease     Chronic back pain     [facility] ([facility] [address]     HCV infection      Dr Rigo Ryan Hx Liver Bx     Headache(784.0)     Hemorrhoids     Hypothyroidism     Mitral valve prolapse     murmer, does not take antibiotics Hx Rheumatic fever    Perianal abscess     PONV (postoperative nausea and vomiting)     Rheumatic fever 1990    S/P colonoscopy 2008    Nick CINTRON     Patient Active Problem List    Diagnosis Date Noted   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    IFG (impaired fasting glucose) 08/06/2019    Class 1 obesity with body mass index (BMI) of 31.0 to 31.9 in adult 08/05/2019    History of rheumatic fever 07/26/2017    Gastroesophageal reflux disease without esophagitis 09/14/2016    Compression fracture 09/21/2015    Grief at loss of child 11/06/2013    Hypothyroidism 09/04/2013    History of hepatitis C 03/21/2012    COPD (chronic obstructive pulmonary disease) ([facility] [address] 03/05/2012    Celiac disease 25/52/6634    Lichen planus 00/92/8259    Allergic rhinitis 11/19/2010     Past Surgical History:   Procedure Laterality Date    CARDIOVASCULAR STRESS TEST  March 2012    nl GXT EF 74%, [facility].  COLONOSCOPY  2006    Dr. Ruth Steele  march 2010    Dr Elizabeth Varela  2003    uterine, Dr Eleanor Long  1/21/16    RIGHT FOOT FIBULAR OSTEOTOMY AND CALCANEAL OSTECTOMY     History reviewed. No pertinent family history.   Social History     Socioeconomic has erythema, pupils are normal, equal, round, reactive to light, no lid lag, stare, proptosis  Ears/Nose: external inspection of ears and nose revealed no abnormalities, hearing is grossly normal  Oropharynx/Mouth/Face: lips, tongue and gums are normal with no lesions, the voice quality was normal  Neck: neck is supple and symmetric, with midline trachea and no masses, thyroid is normal  Lymphatics: normal cervical lymph nodes, normal supraclavicular nodes  Pulmonary: no increased work of breathing or signs of respiratory distress, lungs are clear to auscultation  Cardiovascular: normal heart rate and rhythm, normal S1 and S2, no murmurs and pedal pulses and 2+ bilaterally, No edema  Abdomen: abdomen is soft, non-tender with no masses  Musculoskeletal: normal gait and station and exam of the digits and nails are normal  Neurological: normal coordination and normal general cortical function      Lab Review:    Lab Results   Component Value Date    WBC 5.5 08/05/2019    HGB 14.2 08/05/2019    HCT 41.9 08/05/2019    MCV 97.2 08/05/2019     08/05/2019     Lab Results   Component Value Date     08/05/2019    K 4.9 08/05/2019     08/05/2019    CO2 26 08/05/2019    BUN 8 08/05/2019    CREATININE 0.6 08/05/2019    GLUCOSE 109 08/05/2019    CALCIUM 9.9 08/05/2019    PROT 7.5 08/05/2019    LABALBU 4.7 08/05/2019    BILITOT 0.3 08/05/2019    ALKPHOS 68 08/05/2019    AST 31 08/05/2019    ALT 31 08/05/2019    LABGLOM >60 08/05/2019    GFRAA >60 08/05/2019    GFRAA 120 03/05/2012    AGRATIO 1.7 08/05/2019    GLOB 2.8 08/05/2019     Lab Results   Component Value Date    TSH 1.68 08/13/2018    FT3 2.7 08/05/2019     Lab Results   Component Value Date    LABA1C 6.0 08/05/2019     Lab Results   Component Value Date    .5 08/05/2019     Lab Results   Component Value Date    CHOL 181 08/13/2018     Lab Results   Component Value Date    TRIG 89 08/13/2018     Lab Results   Component Value Date    HDL 73

## 2019-08-09 LAB
T3 REVERSE: 20.5 NG/DL (ref 9–27)
TSH RECEPTOR AB: <0.9 IU/L

## 2019-08-11 LAB — THYROID STIMULATING IMMUNOGLOBULIN: 88 %

## 2019-08-12 RX ORDER — PRASTERONE (DHEA) 25 MG
CAPSULE ORAL
Qty: 30 CAPSULE | Refills: 4 | Status: SHIPPED | OUTPATIENT
Start: 2019-08-12 | End: 2020-01-13 | Stop reason: SDUPTHER

## 2019-08-30 ENCOUNTER — HOSPITAL ENCOUNTER (OUTPATIENT)
Dept: NON INVASIVE DIAGNOSTICS | Age: 59
Discharge: HOME | End: 2019-08-30
Payer: COMMERCIAL

## 2019-08-30 ENCOUNTER — HOSPITAL ENCOUNTER (OUTPATIENT)
Dept: ULTRASOUND IMAGING | Age: 59
Discharge: HOME | End: 2019-08-30
Payer: COMMERCIAL

## 2019-08-30 ENCOUNTER — APPOINTMENT (OUTPATIENT)
Dept: NON INVASIVE DIAGNOSTICS | Age: 59
End: 2019-08-30
Payer: COMMERCIAL

## 2019-08-30 DIAGNOSIS — E03.9 ACQUIRED HYPOTHYROIDISM: ICD-10-CM

## 2019-08-30 DIAGNOSIS — E05.00 GRAVES' ORBITOPATHY: ICD-10-CM

## 2019-08-30 LAB
LV EF: 55 %
LVEF MODALITY: NORMAL

## 2019-08-30 PROCEDURE — 93306 TTE W/DOPPLER COMPLETE: CPT

## 2019-08-30 PROCEDURE — 76536 US EXAM OF HEAD AND NECK: CPT

## 2019-09-03 RX ORDER — FUROSEMIDE 20 MG/1
20 TABLET ORAL DAILY
Qty: 30 TABLET | Refills: 1 | Status: SHIPPED | OUTPATIENT
Start: 2019-09-03 | End: 2020-08-12

## 2019-09-23 ENCOUNTER — OFFICE VISIT (OUTPATIENT)
Dept: ENDOCRINOLOGY | Age: 59
End: 2019-09-23
Payer: COMMERCIAL

## 2019-09-23 ENCOUNTER — NURSE ONLY (OUTPATIENT)
Dept: PRIMARY CARE CLINIC | Age: 59
End: 2019-09-23
Payer: COMMERCIAL

## 2019-09-23 VITALS
SYSTOLIC BLOOD PRESSURE: 115 MMHG | HEART RATE: 62 BPM | BODY MASS INDEX: 29.73 KG/M2 | WEIGHT: 185 LBS | DIASTOLIC BLOOD PRESSURE: 73 MMHG | HEIGHT: 66 IN | OXYGEN SATURATION: 95 %

## 2019-09-23 DIAGNOSIS — E03.9 ACQUIRED HYPOTHYROIDISM: Primary | ICD-10-CM

## 2019-09-23 DIAGNOSIS — R73.01 IFG (IMPAIRED FASTING GLUCOSE): ICD-10-CM

## 2019-09-23 DIAGNOSIS — E66.9 CLASS 1 OBESITY WITH BODY MASS INDEX (BMI) OF 30.0 TO 30.9 IN ADULT, UNSPECIFIED OBESITY TYPE, UNSPECIFIED WHETHER SERIOUS COMORBIDITY PRESENT: ICD-10-CM

## 2019-09-23 DIAGNOSIS — Z23 NEED FOR TDAP VACCINATION: Primary | ICD-10-CM

## 2019-09-23 DIAGNOSIS — E05.00 GRAVES' ORBITOPATHY: ICD-10-CM

## 2019-09-23 PROCEDURE — 3017F COLORECTAL CA SCREEN DOC REV: CPT | Performed by: INTERNAL MEDICINE

## 2019-09-23 PROCEDURE — G8417 CALC BMI ABV UP PARAM F/U: HCPCS | Performed by: INTERNAL MEDICINE

## 2019-09-23 PROCEDURE — 90715 TDAP VACCINE 7 YRS/> IM: CPT | Performed by: FAMILY MEDICINE

## 2019-09-23 PROCEDURE — 99214 OFFICE O/P EST MOD 30 MIN: CPT | Performed by: INTERNAL MEDICINE

## 2019-09-23 PROCEDURE — 1036F TOBACCO NON-USER: CPT | Performed by: INTERNAL MEDICINE

## 2019-09-23 PROCEDURE — G8427 DOCREV CUR MEDS BY ELIG CLIN: HCPCS | Performed by: INTERNAL MEDICINE

## 2019-09-23 PROCEDURE — 90471 IMMUNIZATION ADMIN: CPT | Performed by: FAMILY MEDICINE

## 2019-09-23 RX ORDER — LEVOTHYROXINE SODIUM 75 UG/1
1 CAPSULE ORAL DAILY
Qty: 90 CAPSULE | Refills: 3 | Status: SHIPPED | OUTPATIENT
Start: 2019-09-23 | End: 2020-04-06 | Stop reason: SDUPTHER

## 2019-09-30 DIAGNOSIS — T75.3XXA MOTION SICKNESS, INITIAL ENCOUNTER: ICD-10-CM

## 2019-09-30 DIAGNOSIS — F41.9 ANXIETY: ICD-10-CM

## 2019-09-30 RX ORDER — DIAZEPAM 5 MG/1
TABLET ORAL
Qty: 10 TABLET | Refills: 0 | Status: SHIPPED | OUTPATIENT
Start: 2019-09-30 | End: 2019-10-10

## 2019-10-31 ENCOUNTER — ANESTHESIA (OUTPATIENT)
Dept: ENDOSCOPY | Age: 59
End: 2019-10-31
Payer: COMMERCIAL

## 2019-10-31 ENCOUNTER — ANESTHESIA EVENT (OUTPATIENT)
Dept: ENDOSCOPY | Age: 59
End: 2019-10-31
Payer: COMMERCIAL

## 2019-10-31 ENCOUNTER — HOSPITAL ENCOUNTER (OUTPATIENT)
Age: 59
Setting detail: OUTPATIENT SURGERY
Discharge: HOME | End: 2019-10-31
Attending: INTERNAL MEDICINE | Admitting: INTERNAL MEDICINE
Payer: COMMERCIAL

## 2019-10-31 VITALS
RESPIRATION RATE: 14 BRPM | DIASTOLIC BLOOD PRESSURE: 68 MMHG | OXYGEN SATURATION: 97 % | SYSTOLIC BLOOD PRESSURE: 98 MMHG

## 2019-10-31 VITALS
SYSTOLIC BLOOD PRESSURE: 121 MMHG | DIASTOLIC BLOOD PRESSURE: 65 MMHG | RESPIRATION RATE: 17 BRPM | OXYGEN SATURATION: 97 % | HEART RATE: 69 BPM

## 2019-10-31 PROCEDURE — 6360000002 HC RX W HCPCS: Performed by: NURSE ANESTHETIST, CERTIFIED REGISTERED

## 2019-10-31 PROCEDURE — 2709999900 HC NON-CHARGEABLE SUPPLY: Performed by: INTERNAL MEDICINE

## 2019-10-31 PROCEDURE — 2500000003 HC RX 250 WO HCPCS: Performed by: NURSE ANESTHETIST, CERTIFIED REGISTERED

## 2019-10-31 PROCEDURE — 7100000010 HC PHASE II RECOVERY - FIRST 15 MIN: Performed by: INTERNAL MEDICINE

## 2019-10-31 PROCEDURE — 3609010200 HC COLONOSCOPY ABLATION TUMOR POLYP/OTHER LES: Performed by: INTERNAL MEDICINE

## 2019-10-31 PROCEDURE — 88305 TISSUE EXAM BY PATHOLOGIST: CPT

## 2019-10-31 PROCEDURE — 2580000003 HC RX 258: Performed by: NURSE ANESTHETIST, CERTIFIED REGISTERED

## 2019-10-31 PROCEDURE — 7100000011 HC PHASE II RECOVERY - ADDTL 15 MIN: Performed by: INTERNAL MEDICINE

## 2019-10-31 PROCEDURE — 3700000000 HC ANESTHESIA ATTENDED CARE: Performed by: INTERNAL MEDICINE

## 2019-10-31 PROCEDURE — 3700000001 HC ADD 15 MINUTES (ANESTHESIA): Performed by: INTERNAL MEDICINE

## 2019-10-31 RX ORDER — ONDANSETRON 2 MG/ML
INJECTION INTRAMUSCULAR; INTRAVENOUS PRN
Status: DISCONTINUED | OUTPATIENT
Start: 2019-10-31 | End: 2019-10-31 | Stop reason: SDUPTHER

## 2019-10-31 RX ORDER — DEXAMETHASONE SODIUM PHOSPHATE 4 MG/ML
INJECTION, SOLUTION INTRA-ARTICULAR; INTRALESIONAL; INTRAMUSCULAR; INTRAVENOUS; SOFT TISSUE PRN
Status: DISCONTINUED | OUTPATIENT
Start: 2019-10-31 | End: 2019-10-31 | Stop reason: SDUPTHER

## 2019-10-31 RX ORDER — SODIUM CHLORIDE, SODIUM LACTATE, POTASSIUM CHLORIDE, CALCIUM CHLORIDE 600; 310; 30; 20 MG/100ML; MG/100ML; MG/100ML; MG/100ML
INJECTION, SOLUTION INTRAVENOUS CONTINUOUS PRN
Status: DISCONTINUED | OUTPATIENT
Start: 2019-10-31 | End: 2019-10-31 | Stop reason: SDUPTHER

## 2019-10-31 RX ORDER — LIDOCAINE HYDROCHLORIDE 10 MG/ML
INJECTION, SOLUTION EPIDURAL; INFILTRATION; INTRACAUDAL; PERINEURAL PRN
Status: DISCONTINUED | OUTPATIENT
Start: 2019-10-31 | End: 2019-10-31 | Stop reason: SDUPTHER

## 2019-10-31 RX ORDER — PROPOFOL 10 MG/ML
INJECTION, EMULSION INTRAVENOUS PRN
Status: DISCONTINUED | OUTPATIENT
Start: 2019-10-31 | End: 2019-10-31 | Stop reason: SDUPTHER

## 2019-10-31 RX ADMIN — LIDOCAINE HYDROCHLORIDE 100 MG: 10 INJECTION, SOLUTION EPIDURAL; INFILTRATION; INTRACAUDAL; PERINEURAL at 07:54

## 2019-10-31 RX ADMIN — PROPOFOL 50 MG: 10 INJECTION, EMULSION INTRAVENOUS at 07:54

## 2019-10-31 RX ADMIN — ONDANSETRON 4 MG: 2 INJECTION INTRAMUSCULAR; INTRAVENOUS at 07:50

## 2019-10-31 RX ADMIN — PROPOFOL 50 MG: 10 INJECTION, EMULSION INTRAVENOUS at 08:00

## 2019-10-31 RX ADMIN — DEXAMETHASONE SODIUM PHOSPHATE 4 MG: 4 INJECTION, SOLUTION INTRAMUSCULAR; INTRAVENOUS at 07:50

## 2019-10-31 RX ADMIN — PROPOFOL 50 MG: 10 INJECTION, EMULSION INTRAVENOUS at 07:57

## 2019-10-31 RX ADMIN — SODIUM CHLORIDE, SODIUM LACTATE, POTASSIUM CHLORIDE, AND CALCIUM CHLORIDE: 600; 310; 30; 20 INJECTION, SOLUTION INTRAVENOUS at 07:33

## 2019-10-31 RX ADMIN — PROPOFOL 50 MG: 10 INJECTION, EMULSION INTRAVENOUS at 07:56

## 2019-10-31 RX ADMIN — PROPOFOL 50 MG: 10 INJECTION, EMULSION INTRAVENOUS at 07:55

## 2019-10-31 ASSESSMENT — PAIN SCALES - GENERAL
PAINLEVEL_OUTOF10: 0
PAINLEVEL_OUTOF10: 0

## 2019-10-31 ASSESSMENT — PULMONARY FUNCTION TESTS
PIF_VALUE: 0
PIF_VALUE: 1
PIF_VALUE: 0

## 2019-10-31 ASSESSMENT — PAIN SCALES - WONG BAKER
WONGBAKER_NUMERICALRESPONSE: 0
WONGBAKER_NUMERICALRESPONSE: 0

## 2020-02-28 RX ORDER — PRASTERONE (DHEA) 25 MG
CAPSULE ORAL
Qty: 30 CAPSULE | Refills: 3 | Status: SHIPPED | OUTPATIENT
Start: 2020-02-28 | End: 2021-09-29

## 2020-03-23 RX ORDER — ALBUTEROL SULFATE 90 UG/1
2 AEROSOL, METERED RESPIRATORY (INHALATION) EVERY 6 HOURS PRN
Qty: 1 INHALER | Refills: 2 | Status: SHIPPED | OUTPATIENT
Start: 2020-03-23 | End: 2020-09-25 | Stop reason: SDUPTHER

## 2020-04-06 RX ORDER — LEVOTHYROXINE SODIUM 75 UG/1
1 CAPSULE ORAL DAILY
Qty: 90 CAPSULE | Refills: 3 | OUTPATIENT
Start: 2020-04-06

## 2020-04-10 RX ORDER — DIAZEPAM 5 MG/1
TABLET ORAL
Qty: 30 TABLET | Refills: 0 | Status: SHIPPED | OUTPATIENT
Start: 2020-04-10 | End: 2021-10-26

## 2020-04-10 NOTE — TELEPHONE ENCOUNTER
Medication:   Requested Prescriptions     Pending Prescriptions Disp Refills    diazePAM (VALIUM) 5 MG tablet [Pharmacy Med Name: DIAZEPAM 5 MG TABLET] 30 tablet 0     Sig: TAKE ONE TABLET BY MOUTH ONCE NIGHTLY FOR ANXIETY        Last Filled:      Patient Phone Number: [phone] (home) [phone] (work)    Last appt: 8/13/2018 last  Visit 08-  physical  Next appt: Visit date not found    Last OARRS:   RX Monitoring 1/8/2018   Attestation The Prescription Monitoring Report for this patient was reviewed today. Preferred Pharmacy:   [facility] [address] [facility] [address] [address] [place] [address] Road  Phone: [phone] Fax: [phone]    [facility]/pharmacy #[id number]- General acute hospital 187, [facility] [address] [place] [address],[facility] [address] [place] [address] [address] [place] [address]  Phone: [phone] Fax: Cheryl Rosas 45. - P [phone] - F [phone]  [address] [facility]  Phone: [phone] Fax: 3 [facility]. [address] [place] [address] [phone] -  [phone]  [address] [place] [address]  Phone: [phone] Fax: [phone]

## 2020-04-15 RX ORDER — CLOTRIMAZOLE 10 MG/1
10 LOZENGE ORAL; TOPICAL
Qty: 50 TABLET | Refills: 0 | Status: SHIPPED | OUTPATIENT
Start: 2020-04-15 | End: 2020-04-25

## 2020-08-12 ENCOUNTER — OFFICE VISIT (OUTPATIENT)
Dept: PRIMARY CARE CLINIC | Age: 60
End: 2020-08-12
Payer: COMMERCIAL

## 2020-08-12 VITALS
BODY MASS INDEX: 30.18 KG/M2 | SYSTOLIC BLOOD PRESSURE: 126 MMHG | WEIGHT: 187.8 LBS | OXYGEN SATURATION: 99 % | HEART RATE: 75 BPM | TEMPERATURE: 97.2 F | DIASTOLIC BLOOD PRESSURE: 78 MMHG | HEIGHT: 66 IN

## 2020-08-12 DIAGNOSIS — Z00.00 ROUTINE PHYSICAL EXAMINATION: ICD-10-CM

## 2020-08-12 DIAGNOSIS — Z13.1 DIABETES MELLITUS SCREENING: ICD-10-CM

## 2020-08-12 DIAGNOSIS — Z13.220 LIPID SCREENING: ICD-10-CM

## 2020-08-12 DIAGNOSIS — E07.9 THYROID DISORDER: ICD-10-CM

## 2020-08-12 DIAGNOSIS — E55.9 VITAMIN D DEFICIENCY: ICD-10-CM

## 2020-08-12 PROBLEM — D86.9 SARCOIDOSIS: Status: ACTIVE | Noted: 2020-08-12

## 2020-08-12 PROBLEM — H54.62 VISION LOSS OF LEFT EYE: Status: ACTIVE | Noted: 2020-08-12

## 2020-08-12 PROCEDURE — 99396 PREV VISIT EST AGE 40-64: CPT | Performed by: FAMILY MEDICINE

## 2020-08-12 RX ORDER — INFLIXIMAB 100 MG/10ML
5 INJECTION, POWDER, LYOPHILIZED, FOR SOLUTION INTRAVENOUS SEE ADMIN INSTRUCTIONS
COMMUNITY

## 2020-08-12 RX ORDER — TRAMADOL HYDROCHLORIDE 50 MG/1
50 TABLET ORAL EVERY 8 HOURS PRN
Qty: 30 TABLET | Refills: 0 | Status: SHIPPED | OUTPATIENT
Start: 2020-08-12 | End: 2020-08-22

## 2020-08-12 ASSESSMENT — ENCOUNTER SYMPTOMS
COLOR CHANGE: 0
VOMITING: 0
EYE PAIN: 0
SINUS PRESSURE: 0
COUGH: 0
VOICE CHANGE: 0
ABDOMINAL DISTENTION: 0
CHEST TIGHTNESS: 0
CHOKING: 0
APNEA: 0
CONSTIPATION: 0
DIARRHEA: 1
ALLERGIC/IMMUNOLOGIC COMMENTS: OTC CLARITIN
PHOTOPHOBIA: 0
ABDOMINAL PAIN: 0
WHEEZING: 0
NAUSEA: 0
BLOOD IN STOOL: 0
SORE THROAT: 0
TROUBLE SWALLOWING: 0
RHINORRHEA: 0
EYE DISCHARGE: 0
EYE ITCHING: 0
SHORTNESS OF BREATH: 0
ROS SKIN COMMENTS: DERMATOLOGY
BACK PAIN: 0
SINUS PAIN: 0
EYE REDNESS: 0

## 2020-08-12 NOTE — PROGRESS NOTES
SUBJECTIVE:  Patient ID: Kristopher Christian is a 61 y.o. female. Chief Complaint:  Chief Complaint   Patient presents with    Annual Exam       HPI   61year old female  Annual   Hx COVID positive 3-2020  Dx Sarcoidosis Lt eye  [facility] care Neuro Ophtalmology Dr Jania Macario  High dose Steroid  UC Down town for eye disorder x 2 weeks 2-  Covid Positive twice  Hospitalization 10-12 days [facility]   ICU x 4 days  Pulmonary Care Dr Tyrone Pimentel last visit 8-10-20 next visit 11-3-20  ENT Dr Rodríguez Woods next appointment 11-19-20  Home Oxygen till few weeks ago  Headache given Rx Gabapentin +bad side effect  Headache on Lt side  She had Rx Tramadol  With good result  Sarcoid Specialist Loly Morelos    Current treatment Taper down Steroid last dose 8-  Rx Methotrexate 7.5 mg weekly  Rx Remicaid  Infusion Q 4 week she does get it @Home  First 2 were @infusion center      Past Medical History:   Diagnosis Date    Allergic rhinitis     Anxiety     Celiac disease     Chronic back pain     Emphysema of lung ([address]     GERD (gastroesophageal reflux disease)     HCV infection      Dr Evie Ortizter Hx Liver Bx     Headache(784.0)     Hemorrhoids     Hypothyroidism     Mitral valve prolapse     murmer, does not take antibiotics Hx Rheumatic fever    Perianal abscess     PONV (postoperative nausea and vomiting)     Rheumatic fever 1990    S/P colonoscopy 2008    Nick CINTRON     Past Surgical History:   Procedure Laterality Date    CARDIOVASCULAR STRESS TEST  March 2012    nl GXT EF 74%, [facility].      COLONOSCOPY  2006    Dr. Noelle Abad  march 2010    Dr Dov Rivers 10/31/2019    COLONOSCOPY POLYPECTOMY ABLATION performed by Luisana Camp MD at [address] [place] [address]  2003    uterine, Dr Stacey Orosco  1/21/16    RIGHT FOOT FIBULAR OSTEOTOMY AND CALCANEAL OSTECTOMY    TONSILLECTOMY       Allergies   Allergen Reactions    Gluten Meal (Patient not taking: Reported on 8/12/2020) 30 capsule 3    furosemide (LASIX) 20 MG tablet Take 1 tablet by mouth daily (Patient not taking: Reported on 8/12/2020) 30 tablet 1    Turmeric, Curcuma Longa, (CURCUMIN EXTRACT) POWD by Does not apply route       Current Facility-Administered Medications   Medication Dose Route Frequency Provider Last Rate Last Dose    Tetanus-Diphth-Acell Pertussis (BOOSTRIX) injection 0.5 mL  0.5 mL Intramuscular Once Citlaly Vences MD         Lab Results   Component Value Date    WBC 5.5 08/05/2019    HGB 14.2 08/05/2019    HCT 41.9 08/05/2019    MCV 97.2 08/05/2019     08/05/2019     Lab Results   Component Value Date    CHOL 181 08/13/2018    CHOL 172 10/06/2015    CHOL 173 09/05/2013     Lab Results   Component Value Date    TRIG 89 08/13/2018    TRIG 77 10/06/2015    TRIG 56 09/05/2013     Lab Results   Component Value Date    HDL 73 (H) 08/13/2018    HDL 75 (H) 10/06/2015     (H) 09/05/2013     Lab Results   Component Value Date    LDLCALC 90 08/13/2018    LDLCALC 82 10/06/2015    LDLCALC 60 09/05/2013     Lab Results   Component Value Date    LABVLDL 18 08/13/2018    LABVLDL 15 10/06/2015    LABVLDL 11 09/05/2013     Lab Results   Component Value Date    CHOLHDLRATIO 1.8 11/21/2012       Chemistry        Component Value Date/Time     08/05/2019 1213    K 4.9 08/05/2019 1213     08/05/2019 1213    CO2 26 08/05/2019 1213    BUN 8 08/05/2019 1213    CREATININE 0.6 08/05/2019 1213        Component Value Date/Time    CALCIUM 9.9 08/05/2019 1213    ALKPHOS 68 08/05/2019 1213    AST 31 08/05/2019 1213    ALT 31 08/05/2019 1213    BILITOT 0.3 08/05/2019 1213            Review of Systems   Constitutional: Negative for activity change, appetite change, chills, diaphoresis, fatigue, fever and unexpected weight change.         Weight gain due steroid  Gain 25 LB  + weaker due steroid   Walk   Use Pool /swim   HENT: Negative for congestion, ear discharge, ear pain, hearing loss, nosebleeds, postnasal drip, rhinorrhea, sinus pressure, sinus pain, sore throat, tinnitus, trouble swallowing and voice change. ENT Dr Trista Sarmiento  Sinus procedure Lt open passage   Eyes: Negative for photophobia, pain, discharge, redness, itching and visual disturbance. 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 Full body MRI  Pulmonary specialist @ Dr Catherine Orn  Rx Methotrexate  Rx Remicaid  No Sarcoid in lung  Rescue Albuterol prn   Cardiovascular: Negative for chest pain, palpitations and leg swelling. Holter   Cardiology Dr Tanvi De La Rosa   Gastrointestinal: Positive for diarrhea. Negative for abdominal distention, abdominal pain, blood in stool, constipation, nausea and vomiting. Cramps once a while  Diarrhea once a while   ? Rx Side effect  Dx Celiac  GI Specialist Dr Margret Aguirre in past   Now [facility]   Endocrine: Negative for cold intolerance, heat intolerance, polydipsia, polyphagia and polyuria. Endocrinology  Thyroid disorder   Genitourinary: Negative for dysuria, frequency, pelvic pain and urgency. [facility]YN [facility]  Mammogram @[facility]   Musculoskeletal: Positive for neck pain. Negative for back pain and gait problem. Hx disc disorder,Neck   Skin: Negative for color change and rash. Dermatology   Allergic/Immunologic: Positive for environmental allergies. Negative for food allergies. OTC Claritin   Neurological: Positive for headaches. Negative for dizziness, tremors, light-headedness and numbness. Headache   Full body MRI  Brain MRI  Rx Neurontin no help   She had old Rx Tramadol it does help Headache   Hematological: Negative.     Psychiatric/Behavioral: Negative for agitation, behavioral problems, decreased concentration, self-injury and sleep disturbance. The patient is not hyperactive. Hx Car sick  Travel with RV /camping  G children girl 1 y old + 9 month   She likes care of 9 month old G son  Take Valium prn car sick   Pt doesn't do driving RV  Good sleep       OBJECTIVE:  /78 (Site: Left Upper Arm, Position: Sitting, Cuff Size: Medium Adult)   Pulse 75   Temp 97.2 °F (36.2 °C) (Infrared)   Ht 5' 6\" (1.676 m)   Wt 187 lb 12.8 oz (85.2 kg)   LMP 01/21/2007   SpO2 99%   BMI 30.31 kg/m²   Physical Exam  Constitutional:       General: She is not in acute distress. Appearance: She is well-developed. She is not diaphoretic. HENT:      Head: Normocephalic. Right Ear: External ear normal.      Left Ear: External ear normal.      Nose: Nose normal.   Eyes:      General: No scleral icterus. Right eye: No discharge. Left eye: No discharge. Conjunctiva/sclera: Conjunctivae normal.      Pupils: Pupils are equal, round, and reactive to light. Neck:      Musculoskeletal: Normal range of motion and neck supple. Thyroid: No thyromegaly. Cardiovascular:      Rate and Rhythm: Normal rate and regular rhythm. Heart sounds: Normal heart sounds. No murmur. Pulmonary:      Effort: Pulmonary effort is normal. No respiratory distress. Breath sounds: Normal breath sounds. No wheezing or rales. Chest:      Chest wall: No tenderness. Abdominal:      General: Bowel sounds are normal. There is no distension. Palpations: Abdomen is soft. There is no mass. Tenderness: There is no abdominal tenderness. There is no guarding or rebound. Musculoskeletal: Normal range of motion. Lymphadenopathy:      Cervical: No cervical adenopathy. Skin:     General: Skin is warm. Findings: No rash. Neurological:      Mental Status: She is alert and oriented to person, place, and time.       Deep Tendon Reflexes: Reflexes are normal and symmetric. Psychiatric:         Behavior: Behavior normal.         Thought Content: Thought content normal.         Judgment: Judgment normal.         ASSESSMENT/PLAN:      Diagnosis Orders   1. Routine physical examination  CBC Auto Differential    Comprehensive Metabolic Panel    TSH without Reflex    Hemoglobin A1C    Lipid Panel    Vitamin D 25 Hydroxy   2. Vitamin D deficiency  Vitamin D 25 Hydroxy   3. Diabetes mellitus screening  Hemoglobin A1C   4. Thyroid disorder  TSH without Reflex   5. Lipid screening  Lipid Panel   6. Tension headache  traMADol (ULTRAM) 50 MG tablet   7. Sarcoidosis     8.  Vision loss of left eye       As above   Agreement

## 2020-08-13 LAB
A/G RATIO: 1.6 (ref 1.1–2.2)
ALBUMIN SERPL-MCNC: 4.6 G/DL (ref 3.4–5)
ALP BLD-CCNC: 91 U/L (ref 40–129)
ALT SERPL-CCNC: 73 U/L (ref 10–40)
ANION GAP SERPL CALCULATED.3IONS-SCNC: 14 MMOL/L (ref 3–16)
AST SERPL-CCNC: 53 U/L (ref 15–37)
BASOPHILS ABSOLUTE: 0 K/UL (ref 0–0.2)
BASOPHILS RELATIVE PERCENT: 0.5 %
BILIRUB SERPL-MCNC: 0.3 MG/DL (ref 0–1)
BUN BLDV-MCNC: 11 MG/DL (ref 7–20)
CALCIUM SERPL-MCNC: 9.9 MG/DL (ref 8.3–10.6)
CHLORIDE BLD-SCNC: 107 MMOL/L (ref 99–110)
CHOLESTEROL, TOTAL: 195 MG/DL (ref 0–199)
CO2: 21 MMOL/L (ref 21–32)
CREAT SERPL-MCNC: 0.7 MG/DL (ref 0.6–1.2)
EOSINOPHILS ABSOLUTE: 0.2 K/UL (ref 0–0.6)
EOSINOPHILS RELATIVE PERCENT: 3.8 %
ESTIMATED AVERAGE GLUCOSE: 131.2 MG/DL
GFR AFRICAN AMERICAN: >60
GFR NON-AFRICAN AMERICAN: >60
GLOBULIN: 2.9 G/DL
GLUCOSE BLD-MCNC: 111 MG/DL (ref 70–99)
HBA1C MFR BLD: 6.2 %
HCT VFR BLD CALC: 46.1 % (ref 36–48)
HDLC SERPL-MCNC: 81 MG/DL (ref 40–60)
HEMOGLOBIN: 15 G/DL (ref 12–16)
LDL CHOLESTEROL CALCULATED: 98 MG/DL
LYMPHOCYTES ABSOLUTE: 2.3 K/UL (ref 1–5.1)
LYMPHOCYTES RELATIVE PERCENT: 41.2 %
MCH RBC QN AUTO: 32.6 PG (ref 26–34)
MCHC RBC AUTO-ENTMCNC: 32.7 G/DL (ref 31–36)
MCV RBC AUTO: 100 FL (ref 80–100)
MONOCYTES ABSOLUTE: 0.7 K/UL (ref 0–1.3)
MONOCYTES RELATIVE PERCENT: 12.5 %
NEUTROPHILS ABSOLUTE: 2.4 K/UL (ref 1.7–7.7)
NEUTROPHILS RELATIVE PERCENT: 42 %
PDW BLD-RTO: 15.1 % (ref 12.4–15.4)
PLATELET # BLD: 205 K/UL (ref 135–450)
PMV BLD AUTO: 9.3 FL (ref 5–10.5)
POTASSIUM SERPL-SCNC: 4.6 MMOL/L (ref 3.5–5.1)
RBC # BLD: 4.61 M/UL (ref 4–5.2)
SODIUM BLD-SCNC: 142 MMOL/L (ref 136–145)
TOTAL PROTEIN: 7.5 G/DL (ref 6.4–8.2)
TRIGL SERPL-MCNC: 80 MG/DL (ref 0–150)
TSH SERPL DL<=0.05 MIU/L-ACNC: 0.73 UIU/ML (ref 0.27–4.2)
VITAMIN D 25-HYDROXY: 36 NG/ML
VLDLC SERPL CALC-MCNC: 16 MG/DL
WBC # BLD: 5.6 K/UL (ref 4–11)

## 2020-08-31 RX ORDER — DIAZEPAM 5 MG/1
TABLET ORAL
Qty: 30 TABLET | Refills: 0 | OUTPATIENT
Start: 2020-08-31 | End: 2020-09-30

## 2020-08-31 NOTE — TELEPHONE ENCOUNTER
Medication:   Requested Prescriptions     Pending Prescriptions Disp Refills    diazePAM (VALIUM) 5 MG tablet [Pharmacy Med Name: DIAZEPAM 5 MG TABLET] 30 tablet 0     Sig: TAKE ONE TABLET BY MOUTH ONCE NIGHTLY FOR ANXIETY     Last Filled:  8.7.20  Last appt: 8.12.20   Next appt: Visit date not found    Last OARRS:   RX Monitoring 1/8/2018   Attestation The Prescription Monitoring Report for this patient was reviewed today.

## 2020-09-24 NOTE — TELEPHONE ENCOUNTER
Medication:   Requested Prescriptions     Pending Prescriptions Disp Refills    albuterol sulfate HFA (PROAIR HFA) 108 (90 Base) MCG/ACT inhaler 1 Inhaler 2     Sig: Inhale 2 puffs into the lungs every 6 hours as needed for Wheezing     Last Filled: 3.23.20  Last appt: 8/12/2020   Next appt: Visit date not found    Last OARRS:   RX Monitoring 8/31/2020   Attestation -   Periodic Controlled Substance Monitoring No signs of potential drug abuse or diversion identified.

## 2020-09-25 RX ORDER — ALBUTEROL SULFATE 90 UG/1
2 AEROSOL, METERED RESPIRATORY (INHALATION) EVERY 6 HOURS PRN
Qty: 1 INHALER | Refills: 5 | Status: SHIPPED | OUTPATIENT
Start: 2020-09-25 | End: 2021-09-30 | Stop reason: SDUPTHER

## 2020-10-26 RX ORDER — DIAZEPAM 5 MG/1
TABLET ORAL
Qty: 30 TABLET | Refills: 0 | Status: SHIPPED | OUTPATIENT
Start: 2020-10-26 | End: 2020-11-25

## 2020-10-26 NOTE — TELEPHONE ENCOUNTER
Medication:   Requested Prescriptions     Pending Prescriptions Disp Refills    diazePAM (VALIUM) 5 MG tablet [Pharmacy Med Name: DIAZEPAM 5 MG TABLET] 30 tablet 0     Sig: TAKE ONE TABLET BY MOUTH ONCE NIGHTLY FOR ANXIETY     Last Filled:  8.7.20  Last appt: 8.12.20   Next appt: Visit date not found    Last OARRS:   RX Monitoring 8/31/2020   Attestation -   Periodic Controlled Substance Monitoring No signs of potential drug abuse or diversion identified.

## 2020-12-07 RX ORDER — LEVOTHYROXINE SODIUM 0.07 MG/1
75 TABLET ORAL DAILY
Qty: 30 TABLET | Refills: 5 | OUTPATIENT
Start: 2020-12-07

## 2020-12-07 NOTE — TELEPHONE ENCOUNTER
Medication:   Requested Prescriptions     Pending Prescriptions Disp Refills    levothyroxine (LEVOTHROID) 75 MCG tablet 30 tablet 5     Sig: Take 1 tablet by mouth daily       Last appt: 8/12/2020   Next appt: Visit date not found    Last Thyroid:   Lab Results   Component Value Date    TSH 0.73 08/12/2020    FT3 2.7 08/05/2019    L6PIJEG 1.01 08/06/2019    T4FREE 1.4 08/05/2019    I2EEVMY 6.8 08/06/2019

## 2020-12-30 NOTE — TELEPHONE ENCOUNTER
Pt called and is requesting rx below:    [id number] [facility] [address] [place] [address], [address] [place] [address] [facility] [phone]     Leaving to go to [place] on Saturday.

## 2021-01-11 RX ORDER — LEVOTHYROXINE SODIUM 0.07 MG/1
TABLET ORAL
Qty: 30 TABLET | Refills: 0 | OUTPATIENT
Start: 2021-01-11

## 2021-02-08 RX ORDER — LEVOTHYROXINE SODIUM 0.07 MG/1
TABLET ORAL
Qty: 30 TABLET | Refills: 0 | OUTPATIENT
Start: 2021-02-08

## 2021-02-08 NOTE — TELEPHONE ENCOUNTER
Medication:   Requested Prescriptions     Pending Prescriptions Disp Refills    levothyroxine (SYNTHROID) 75 MCG tablet 30 tablet 0     Last Filled:  7.1.19  Last appt: 8/12/2020   Next appt: Visit date not found    Last Thyroid:   Lab Results   Component Value Date    TSH 0.73 08/12/2020    FT3 2.7 08/05/2019    Z3AUABV 1.01 08/06/2019    T4FREE 1.4 08/05/2019    Q4UTSBD 6.8 08/06/2019

## 2021-03-01 DIAGNOSIS — E03.9 ACQUIRED HYPOTHYROIDISM: ICD-10-CM

## 2021-03-01 RX ORDER — LEVOTHYROXINE SODIUM 75 UG/1
CAPSULE ORAL
Qty: 90 CAPSULE | Refills: 2 | Status: SHIPPED | OUTPATIENT
Start: 2021-03-01 | End: 2021-09-22

## 2021-06-02 RX ORDER — FUROSEMIDE 20 MG/1
20 TABLET ORAL DAILY
Qty: 30 TABLET | Refills: 2 | Status: SHIPPED | OUTPATIENT
Start: 2021-06-02 | End: 2021-09-30 | Stop reason: SDUPTHER

## 2021-09-29 ENCOUNTER — OFFICE VISIT (OUTPATIENT)
Dept: PRIMARY CARE CLINIC | Age: 61
End: 2021-09-29
Payer: COMMERCIAL

## 2021-09-29 VITALS
TEMPERATURE: 98.3 F | DIASTOLIC BLOOD PRESSURE: 75 MMHG | WEIGHT: 189 LBS | BODY MASS INDEX: 30.37 KG/M2 | HEIGHT: 66 IN | HEART RATE: 85 BPM | OXYGEN SATURATION: 98 % | SYSTOLIC BLOOD PRESSURE: 130 MMHG

## 2021-09-29 DIAGNOSIS — E03.9 ACQUIRED HYPOTHYROIDISM: Primary | ICD-10-CM

## 2021-09-29 DIAGNOSIS — R73.09 HIGH GLUCOSE: ICD-10-CM

## 2021-09-29 DIAGNOSIS — Z13.220 LIPID SCREENING: ICD-10-CM

## 2021-09-29 PROBLEM — U07.1 COVID-19: Status: ACTIVE | Noted: 2020-03-29

## 2021-09-29 PROCEDURE — G8427 DOCREV CUR MEDS BY ELIG CLIN: HCPCS | Performed by: FAMILY MEDICINE

## 2021-09-29 PROCEDURE — 1036F TOBACCO NON-USER: CPT | Performed by: FAMILY MEDICINE

## 2021-09-29 PROCEDURE — 3017F COLORECTAL CA SCREEN DOC REV: CPT | Performed by: FAMILY MEDICINE

## 2021-09-29 PROCEDURE — 99214 OFFICE O/P EST MOD 30 MIN: CPT | Performed by: FAMILY MEDICINE

## 2021-09-29 PROCEDURE — G8419 CALC BMI OUT NRM PARAM NOF/U: HCPCS | Performed by: FAMILY MEDICINE

## 2021-09-29 SDOH — ECONOMIC STABILITY: FOOD INSECURITY: WITHIN THE PAST 12 MONTHS, YOU WORRIED THAT YOUR FOOD WOULD RUN OUT BEFORE YOU GOT MONEY TO BUY MORE.: NEVER TRUE

## 2021-09-29 SDOH — ECONOMIC STABILITY: FOOD INSECURITY: WITHIN THE PAST 12 MONTHS, THE FOOD YOU BOUGHT JUST DIDN'T LAST AND YOU DIDN'T HAVE MONEY TO GET MORE.: NEVER TRUE

## 2021-09-29 ASSESSMENT — ENCOUNTER SYMPTOMS
ROS SKIN COMMENTS: DERMATOLOGY ANNUAL
BACK PAIN: 0

## 2021-09-29 ASSESSMENT — PATIENT HEALTH QUESTIONNAIRE - PHQ9
1. LITTLE INTEREST OR PLEASURE IN DOING THINGS: 0
SUM OF ALL RESPONSES TO PHQ QUESTIONS 1-9: 0
2. FEELING DOWN, DEPRESSED OR HOPELESS: 0
SUM OF ALL RESPONSES TO PHQ9 QUESTIONS 1 & 2: 0
SUM OF ALL RESPONSES TO PHQ QUESTIONS 1-9: 0
SUM OF ALL RESPONSES TO PHQ QUESTIONS 1-9: 0

## 2021-09-29 ASSESSMENT — SOCIAL DETERMINANTS OF HEALTH (SDOH): HOW HARD IS IT FOR YOU TO PAY FOR THE VERY BASICS LIKE FOOD, HOUSING, MEDICAL CARE, AND HEATING?: NOT HARD AT ALL

## 2021-09-29 NOTE — PROGRESS NOTES
SUBJECTIVE:  Patient ID: Alex Bailon is a 64 y.o. female. Chief Complaint:  Chief Complaint   Patient presents with    Thyroid Problem       HPI   64year old Female  Sarcoidosis  Cause affect Left eye Blind  She get Remicade infusion in [place] Q 6 week  Thyroid disorder Rx need PA  Celiac disease  Hx Covid illness 4/2020    Past Medical History:   Diagnosis Date    Allergic rhinitis     Anxiety     Celiac disease     Chronic back pain     Emphysema of lung ([address]     GERD (gastroesophageal reflux disease)     HCV infection      Dr Esteban Gayle Hx Liver Bx     Headache(784.0)     Hemorrhoids     Hypothyroidism     Mitral valve prolapse     murmer, does not take antibiotics Hx Rheumatic fever    Perianal abscess     PONV (postoperative nausea and vomiting)     Rheumatic fever 1990    S/P colonoscopy 2008    Nick CINTORN     Past Surgical History:   Procedure Laterality Date    CARDIOVASCULAR STRESS TEST  March 2012    nl GXT EF 74%, [facility].      COLONOSCOPY  2006    Dr. Galina Hudson  march 2010    Dr Cece Santana 10/31/2019    COLONOSCOPY POLYPECTOMY ABLATION performed by Alden Jimenes MD at [facility]  2003    uterine, Dr Zoltan Mchugh  1/21/16    RIGHT FOOT FIBULAR OSTEOTOMY AND CALCANEAL OSTECTOMY    TONSILLECTOMY       Allergies   Allergen Reactions    Gluten Meal     Pcn [Penicillins]      Hives    Biaxin [Clarithromycin]      Stomach upset    Codeine      Stomach upset    Macrobid [Nitrofurantoin Monohyd Macro]     Percocet [Oxycodone-Acetaminophen]      Stomach upset    Pneumovax [Pneumococcal Polysaccharide Vaccine] Swelling     Red, swollen, painful    Augmentin [Amoxicillin-Pot Clavulanate] Rash     Family History   Problem Relation Age of Onset    Other Mother         age [de-identified]     Other Father         age 72 [place] +Smoker     Social History     Social History Narrative    Retired 2018 Date    CHOL 195 08/12/2020    CHOL 181 08/13/2018    CHOL 172 10/06/2015     Lab Results   Component Value Date    TRIG 80 08/12/2020    TRIG 89 08/13/2018    TRIG 77 10/06/2015     Lab Results   Component Value Date    HDL 81 (H) 08/12/2020    HDL 73 (H) 08/13/2018    HDL 75 (H) 10/06/2015     Lab Results   Component Value Date    LDLCALC 98 08/12/2020    LDLCALC 90 08/13/2018    LDLCALC 82 10/06/2015     Lab Results   Component Value Date    LABVLDL 16 08/12/2020    LABVLDL 18 08/13/2018    LABVLDL 15 10/06/2015     Lab Results   Component Value Date    CHOLHDLRATIO 1.8 11/21/2012       Chemistry        Component Value Date/Time     08/12/2020 1124    K 4.6 08/12/2020 1124     08/12/2020 1124    CO2 21 08/12/2020 1124    BUN 11 08/12/2020 1124    CREATININE 0.7 08/12/2020 1124        Component Value Date/Time    CALCIUM 9.9 08/12/2020 1124    ALKPHOS 91 08/12/2020 1124    AST 53 (H) 08/12/2020 1124    ALT 73 (H) 08/12/2020 1124    BILITOT 0.3 08/12/2020 1124            Review of Systems   Constitutional: Negative. Over all  function   HENT: Negative. Allergy  Sinus    Eyes:        Blind Left eye   Dx Sarcoid  Get Infusion Q 6 week  RT eye sign aging   Respiratory:        Inhaler prn  Allergy  Sarcoid specialist   Cardiovascular: Negative for chest pain, palpitations and leg swelling. Gastrointestinal:        Celiac disease  Colonoscopy    Endocrine:        Thyroid   Rx Tirosint    Genitourinary: Negative for dysuria, flank pain and frequency. Marylou Ibarra MD [facility]  Mammogram    Musculoskeletal: Positive for neck pain. Negative for back pain. Chiropractor  Massage   Skin:        Dermatology annual   Allergic/Immunologic: Positive for environmental allergies. Neurological: Negative for dizziness and headaches. Psychiatric/Behavioral: Negative for behavioral problems, sleep disturbance and suicidal ideas. The patient is not hyperactive.         OBJECTIVE:  /75 (Site: Right Upper Arm, Position: Sitting, Cuff Size: Large Adult)   Pulse 85   Temp 98.3 °F (36.8 °C) (Oral)   Ht 5' 6\" (1.676 m)   Wt 189 lb (85.7 kg)   LMP 01/21/2007   SpO2 98%   BMI 30.51 kg/m²   Physical Exam  Constitutional:       Comments: BMI 30    HENT:      Right Ear: Tympanic membrane normal.      Left Ear: Tympanic membrane normal.   Eyes:      Pupils: Pupils are equal, round, and reactive to light. Cardiovascular:      Rate and Rhythm: Normal rate and regular rhythm. Heart sounds: Normal heart sounds. Pulmonary:      Effort: Pulmonary effort is normal.      Breath sounds: Normal breath sounds. No wheezing or rhonchi. Musculoskeletal:      Cervical back: Normal range of motion and neck supple. Neurological:      General: No focal deficit present. Mental Status: She is alert and oriented to person, place, and time. Psychiatric:         Mood and Affect: Mood normal.         Behavior: Behavior normal.         Thought Content: Thought content normal.         Judgment: Judgment normal.         ASSESSMENT/PLAN:      Diagnosis Orders   1. Acquired hypothyroidism  TSH without Reflex   2. High glucose  Hemoglobin A1C   3.  Lipid screening  Lipid Panel       Pt need special Thyroid Rx due to Celiac disease  Lab update  No covid Vaccine  Advice get Covid Vaccine   Visit 30 minutes

## 2021-09-30 NOTE — TELEPHONE ENCOUNTER
Medication:   Requested Prescriptions     Pending Prescriptions Disp Refills    albuterol sulfate HFA (PROAIR HFA) 108 (90 Base) MCG/ACT inhaler 1 g 0     Sig: Inhale 2 puffs into the lungs every 6 hours as needed for Wheezing     Last Filled: 09/25/20    Last appt: 9/29/2021   Next appt: Visit date not found    Last OARRS:   RX Monitoring 8/31/2020   Attestation -   Periodic Controlled Substance Monitoring No signs of potential drug abuse or diversion identified.

## 2021-10-01 RX ORDER — FUROSEMIDE 20 MG/1
20 TABLET ORAL DAILY
Qty: 90 TABLET | Refills: 1 | Status: SHIPPED | OUTPATIENT
Start: 2021-10-01 | End: 2021-10-15 | Stop reason: SDUPTHER

## 2021-10-01 RX ORDER — ALBUTEROL SULFATE 90 UG/1
2 AEROSOL, METERED RESPIRATORY (INHALATION) EVERY 6 HOURS PRN
Qty: 3 G | Refills: 2 | Status: SHIPPED | OUTPATIENT
Start: 2021-10-01 | End: 2021-10-15 | Stop reason: SDUPTHER

## 2021-10-15 RX ORDER — FUROSEMIDE 20 MG/1
20 TABLET ORAL DAILY
Qty: 90 TABLET | Refills: 1 | Status: SHIPPED | OUTPATIENT
Start: 2021-10-15 | End: 2022-01-18 | Stop reason: SDUPTHER

## 2021-10-15 RX ORDER — ALBUTEROL SULFATE 90 UG/1
2 AEROSOL, METERED RESPIRATORY (INHALATION) EVERY 6 HOURS PRN
Qty: 3 G | Refills: 2 | Status: SHIPPED | OUTPATIENT
Start: 2021-10-15 | End: 2022-01-13

## 2021-10-23 DIAGNOSIS — F41.9 ANXIETY: ICD-10-CM

## 2021-10-25 NOTE — TELEPHONE ENCOUNTER
Medication:   Requested Prescriptions     Pending Prescriptions Disp Refills    diazePAM (VALIUM) 5 MG tablet [Pharmacy Med Name: DIAZEPAM 5 MG TABLET] 30 tablet      Sig: TAKE ONE TABLET BY MOUTH ONCE NIGHTLY FOR FOR ANXIETY       Last appt: 9.29.21  Next appt: Visit date not found    Last OARRS:   RX Monitoring 8/31/2020   Attestation -   Periodic Controlled Substance Monitoring No signs of potential drug abuse or diversion identified.

## 2021-10-26 RX ORDER — DIAZEPAM 5 MG/1
TABLET ORAL
Qty: 30 TABLET | Refills: 0 | Status: SHIPPED | OUTPATIENT
Start: 2021-10-26 | End: 2021-11-25

## 2021-10-28 ENCOUNTER — TELEPHONE (OUTPATIENT)
Dept: PRIMARY CARE CLINIC | Age: 61
End: 2021-10-28

## 2021-11-16 ENCOUNTER — PATIENT MESSAGE (OUTPATIENT)
Dept: PRIMARY CARE CLINIC | Age: 61
End: 2021-11-16

## 2021-11-16 DIAGNOSIS — E03.9 ACQUIRED HYPOTHYROIDISM: ICD-10-CM

## 2021-11-16 NOTE — TELEPHONE ENCOUNTER
From: Tricia Tejada  To: Dr. Kan Shock: 11/16/2021 4:11 PM EST  Subject: Prescription Question    My prescription for Tirosint is not on this list. I had my appointment and bloodwork.  Please send refill for year to [facility] at [address], [facility] [address] [phone]    Thanks so much,   Shayla Schultz

## 2021-11-17 RX ORDER — LEVOTHYROXINE SODIUM 75 UG/1
CAPSULE ORAL
Qty: 90 CAPSULE | Refills: 2 | Status: SHIPPED | OUTPATIENT
Start: 2021-11-17

## 2022-01-18 RX ORDER — FUROSEMIDE 20 MG/1
20 TABLET ORAL DAILY
Qty: 90 TABLET | Refills: 0 | Status: SHIPPED | OUTPATIENT
Start: 2022-01-18

## 2022-01-24 DIAGNOSIS — E03.9 ACQUIRED HYPOTHYROIDISM: ICD-10-CM

## 2022-01-24 RX ORDER — LEVOTHYROXINE SODIUM 0.07 MG/1
75 TABLET ORAL DAILY
Qty: 90 TABLET | Refills: 1 | OUTPATIENT
Start: 2022-01-24

## 2022-04-11 ENCOUNTER — TELEPHONE (OUTPATIENT)
Dept: PRIMARY CARE CLINIC | Age: 62
End: 2022-04-11

## 2022-04-11 NOTE — TELEPHONE ENCOUNTER
Received a fax from [facility]. States L thyroxine (Synthroid) 75mcg wils save pt $806.31a year. Please send to pharmacy if appropriate instead of Tirosint Caps.

## 2022-08-09 DIAGNOSIS — E03.9 ACQUIRED HYPOTHYROIDISM: ICD-10-CM

## 2022-08-09 RX ORDER — LEVOTHYROXINE SODIUM 75 UG/1
CAPSULE ORAL
Qty: 90 CAPSULE | Refills: 2 | OUTPATIENT
Start: 2022-08-09

## 2022-08-09 NOTE — TELEPHONE ENCOUNTER
Medication:   Requested Prescriptions     Pending Prescriptions Disp Refills    TIROSINT 76 MCG CAPS [Pharmacy Med Name: Bala Baeza 76 MCG CAP[$]] 90 capsule 2     Sig: TAKE ONE CAPSULE BY MOUTH ONE TIME DAILY     Last Filled:  11.17.21    Last appt: 9/29/2021   Next appt: Visit date not found    Last OARRS:   RX Monitoring 8/31/2020   Attestation -   Periodic Controlled Substance Monitoring No signs of potential drug abuse or diversion identified.
